# Patient Record
Sex: MALE | Race: WHITE | NOT HISPANIC OR LATINO | Employment: OTHER | URBAN - METROPOLITAN AREA
[De-identification: names, ages, dates, MRNs, and addresses within clinical notes are randomized per-mention and may not be internally consistent; named-entity substitution may affect disease eponyms.]

---

## 2023-03-08 ENCOUNTER — TELEPHONE (OUTPATIENT)
Dept: PRIMARY CARE | Facility: CLINIC | Age: 68
End: 2023-03-08
Payer: MEDICARE

## 2023-03-08 NOTE — TELEPHONE ENCOUNTER
Pt's wife, Alison, left a msg asking for a new order for a CT Cardiac Calcium Score be entered for Miguel.    Cyclosporine Counseling:  I discussed with the patient the risks of cyclosporine including but not limited to hypertension, gingival hyperplasia,myelosuppression, immunosuppression, liver damage, kidney damage, neurotoxicity, lymphoma, and serious infections. The patient understands that monitoring is required including baseline blood pressure, CBC, CMP, lipid panel and uric acid, and then 1-2 times monthly CMP and blood pressure.

## 2023-03-09 DIAGNOSIS — Z13.6 SCREENING FOR HEART DISEASE: Primary | ICD-10-CM

## 2023-03-10 ENCOUNTER — TELEPHONE (OUTPATIENT)
Dept: PRIMARY CARE | Facility: CLINIC | Age: 68
End: 2023-03-10

## 2023-03-10 NOTE — TELEPHONE ENCOUNTER
Patients wife came in asking about calcium screenings for her and Miguel. She said that you ordered this for them years ago and they never went through with it. She was wondering if they both can come in and get that ordered finally. She was also wondering if Miguel had to come in for an appointment in order to get the calcium.

## 2023-05-21 DIAGNOSIS — I10 PRIMARY HYPERTENSION: Primary | ICD-10-CM

## 2023-05-22 RX ORDER — LOSARTAN POTASSIUM 50 MG/1
TABLET ORAL
Qty: 90 TABLET | Refills: 3 | Status: SHIPPED | OUTPATIENT
Start: 2023-05-22 | End: 2024-04-01

## 2023-05-28 DIAGNOSIS — E03.9 HYPOTHYROIDISM, UNSPECIFIED TYPE: Primary | ICD-10-CM

## 2023-05-30 RX ORDER — LEVOTHYROXINE SODIUM 112 UG/1
TABLET ORAL
Qty: 100 TABLET | Refills: 2 | Status: SHIPPED | OUTPATIENT
Start: 2023-05-30 | End: 2024-04-01

## 2023-06-05 ENCOUNTER — TELEPHONE (OUTPATIENT)
Dept: PRIMARY CARE | Facility: CLINIC | Age: 68
End: 2023-06-05
Payer: MEDICARE

## 2023-06-05 NOTE — TELEPHONE ENCOUNTER
Pt's wife, Zuri, left a msg stating that Miguel injured his back last week and is doing exercises and it's not improving.  They are asking for a referral for PT, or an ortho dr.  He needs some kind of TX since this has been going on for over a week now.

## 2023-06-06 DIAGNOSIS — M54.9 ACUTE BACK PAIN, UNSPECIFIED BACK LOCATION, UNSPECIFIED BACK PAIN LATERALITY: ICD-10-CM

## 2023-06-08 NOTE — TELEPHONE ENCOUNTER
I spoke with the pt and he said that he went to the [facility] and they referred him to PT also.  He said that he is getting better and will continue with the PT.

## 2023-09-06 ENCOUNTER — APPOINTMENT (OUTPATIENT)
Dept: PRIMARY CARE | Facility: CLINIC | Age: 68
End: 2023-09-06
Payer: MEDICARE

## 2023-09-19 ENCOUNTER — APPOINTMENT (OUTPATIENT)
Dept: PRIMARY CARE | Facility: CLINIC | Age: 68
End: 2023-09-19
Payer: MEDICARE

## 2023-09-20 ENCOUNTER — OFFICE VISIT (OUTPATIENT)
Dept: PRIMARY CARE | Facility: CLINIC | Age: 68
End: 2023-09-20
Payer: MEDICARE

## 2023-09-20 VITALS
DIASTOLIC BLOOD PRESSURE: 70 MMHG | SYSTOLIC BLOOD PRESSURE: 112 MMHG | BODY MASS INDEX: 29.49 KG/M2 | WEIGHT: 187.87 LBS | HEIGHT: 67 IN

## 2023-09-20 DIAGNOSIS — I10 PRIMARY HYPERTENSION: ICD-10-CM

## 2023-09-20 DIAGNOSIS — Z00.00 ROUTINE CHECK-UP: Primary | ICD-10-CM

## 2023-09-20 DIAGNOSIS — Z23 ENCOUNTER FOR IMMUNIZATION: ICD-10-CM

## 2023-09-20 PROBLEM — J45.909 ASTHMA (HHS-HCC): Status: ACTIVE | Noted: 2023-09-20

## 2023-09-20 PROBLEM — E78.5 HYPERLIPIDEMIA: Status: ACTIVE | Noted: 2023-09-20

## 2023-09-20 PROBLEM — R35.0 URINARY FREQUENCY: Status: ACTIVE | Noted: 2023-09-20

## 2023-09-20 PROBLEM — R39.198 DIFFICULTY IN URINATION: Status: ACTIVE | Noted: 2023-09-20

## 2023-09-20 PROBLEM — E03.9 ADULT HYPOTHYROIDISM: Status: ACTIVE | Noted: 2023-09-20

## 2023-09-20 PROCEDURE — 3078F DIAST BP <80 MM HG: CPT | Performed by: INTERNAL MEDICINE

## 2023-09-20 PROCEDURE — 3074F SYST BP LT 130 MM HG: CPT | Performed by: INTERNAL MEDICINE

## 2023-09-20 PROCEDURE — G0439 PPPS, SUBSEQ VISIT: HCPCS | Performed by: INTERNAL MEDICINE

## 2023-09-20 PROCEDURE — 1036F TOBACCO NON-USER: CPT | Performed by: INTERNAL MEDICINE

## 2023-09-20 PROCEDURE — 1170F FXNL STATUS ASSESSED: CPT | Performed by: INTERNAL MEDICINE

## 2023-09-20 PROCEDURE — G0009 ADMIN PNEUMOCOCCAL VACCINE: HCPCS | Performed by: INTERNAL MEDICINE

## 2023-09-20 PROCEDURE — 1159F MED LIST DOCD IN RCRD: CPT | Performed by: INTERNAL MEDICINE

## 2023-09-20 PROCEDURE — 90662 IIV NO PRSV INCREASED AG IM: CPT | Performed by: INTERNAL MEDICINE

## 2023-09-20 PROCEDURE — 99397 PER PM REEVAL EST PAT 65+ YR: CPT | Performed by: INTERNAL MEDICINE

## 2023-09-20 PROCEDURE — 90677 PCV20 VACCINE IM: CPT | Performed by: INTERNAL MEDICINE

## 2023-09-20 PROCEDURE — G0008 ADMIN INFLUENZA VIRUS VAC: HCPCS | Performed by: INTERNAL MEDICINE

## 2023-09-20 PROCEDURE — 1160F RVW MEDS BY RX/DR IN RCRD: CPT | Performed by: INTERNAL MEDICINE

## 2023-09-20 RX ORDER — ASCORBIC ACID 300 MG
TABLET,CHEWABLE ORAL
COMMUNITY

## 2023-09-20 RX ORDER — BUDESONIDE AND FORMOTEROL FUMARATE DIHYDRATE 80; 4.5 UG/1; UG/1
2 AEROSOL RESPIRATORY (INHALATION) DAILY
COMMUNITY
Start: 2019-02-05 | End: 2024-02-01

## 2023-09-20 RX ORDER — MULTIVITAMIN
TABLET ORAL
COMMUNITY

## 2023-09-20 RX ORDER — AMLODIPINE BESYLATE 5 MG/1
1 TABLET ORAL DAILY
COMMUNITY
Start: 2019-02-05 | End: 2024-01-16 | Stop reason: SDUPTHER

## 2023-09-20 RX ORDER — MONTELUKAST SODIUM 10 MG/1
TABLET ORAL
COMMUNITY
Start: 2019-02-05 | End: 2024-04-04 | Stop reason: WASHOUT

## 2023-09-20 RX ORDER — SIMVASTATIN 5 MG/1
TABLET, FILM COATED ORAL
COMMUNITY
Start: 2019-02-05 | End: 2023-10-03 | Stop reason: ALTCHOICE

## 2023-09-20 RX ORDER — FAMOTIDINE 20 MG/1
20 TABLET, FILM COATED ORAL DAILY
COMMUNITY
End: 2024-04-04 | Stop reason: WASHOUT

## 2023-09-20 ASSESSMENT — PATIENT HEALTH QUESTIONNAIRE - PHQ9
2. FEELING DOWN, DEPRESSED OR HOPELESS: NOT AT ALL
SUM OF ALL RESPONSES TO PHQ9 QUESTIONS 1 AND 2: 0
1. LITTLE INTEREST OR PLEASURE IN DOING THINGS: NOT AT ALL

## 2023-09-20 NOTE — PROGRESS NOTES
"Subjective   Reason for Visit: Miguel May is an 67 y.o. male here for a Medicare Wellness visit.     Past Medical, Surgical, and Family History reviewed and updated in chart.    Reviewed all medications by prescribing practitioner or clinical pharmacist (such as prescriptions, OTCs, herbal therapies and supplements) and documented in the medical record.    HPI    Patient Care Team:  Mary Ellen Arroyo MD as PCP - General  Mary Ellen Arroyo MD as PCP - [employer] PCP     Review of Systems   All other systems reviewed and are negative.      Objective   Vitals:  /70   Ht 1.708 m (5' 7.25\")   Wt 85.2 kg (187 lb 13.9 oz)   BMI 29.21 kg/m²       Physical Exam  Constitutional:       General: He is not in acute distress.     Appearance: Normal appearance. He is not ill-appearing.   HENT:      Head: Normocephalic and atraumatic.      Right Ear: Tympanic membrane and ear canal normal.      Left Ear: Tympanic membrane and ear canal normal.      Nose: Nose normal.      Mouth/Throat:      Mouth: Mucous membranes are moist.      Pharynx: Oropharynx is clear.   Eyes:      General: No scleral icterus.     Extraocular Movements: Extraocular movements intact.      Conjunctiva/sclera: Conjunctivae normal.      Pupils: Pupils are equal, round, and reactive to light.   Cardiovascular:      Rate and Rhythm: Normal rate and regular rhythm.      Pulses: Normal pulses.      Heart sounds: No murmur heard.     No friction rub.   Pulmonary:      Effort: Pulmonary effort is normal.      Breath sounds: Normal breath sounds. No rhonchi or rales.   Abdominal:      General: Abdomen is flat. Bowel sounds are normal. There is no distension.      Palpations: Abdomen is soft. There is no mass.      Tenderness: There is no abdominal tenderness.   Musculoskeletal:      Cervical back: Normal range of motion and neck supple.      Right lower leg: No edema.      Left lower leg: No edema.   Skin:     General: Skin is warm. "   Neurological:      General: No focal deficit present.      Mental Status: He is alert and oriented to person, place, and time.      Cranial Nerves: No cranial nerve deficit.   Psychiatric:         Mood and Affect: Mood normal.         Behavior: Behavior normal.         Judgment: Judgment normal.       Assessment/Plan   Problem List Items Addressed This Visit    None  Visit Diagnoses       Routine check-up    -  Primary    Relevant Orders    CBC    Basic Metabolic Panel    Alanine Aminotransferase    Lipid Panel    Prostate Specific Antigen    Hemoglobin A1C    Encounter for immunization        Relevant Orders    Flu vaccine, quadrivalent, high-dose, preservative free, age 65y+ (FLUZONE) (Completed)    Primary hypertension        Relevant Orders    CBC          #1 hypertension- stable  #2 history of SVT-status post ablation 2013. No issue since. No recent palpitations. f/u cards  #3 hypothyroidism-on treatment for several years. Quite stable. Energy good. No heat/cold intolerance. No skin changes. Bowel stable. labs  #4 hyperlipidemia-on treatment , remains on a reduced sugar/carbohydrate/fat diet. Labs.  Consider intensifying pending results given strong family history.  Reviewed with patient  #5 asthma-using Symbicort once a day only with good control. Primarily exercise induced only. Very rare Ventolin use otherwise. No nighttime awakenings or shortness of breath wanted. f/u pulm  #6 AM urinary freq- stable  #7 increased PSA- normalized. Follow-up with urology PRN       +fhx CAD (father/brother)  Reviewed Shingrix   [id number] 9/18.   Pneumovax 9/19  colonoscopy 2016--> due 2026  reviewed risk of NSAIDS  COVID booster (BiValent) ASAP

## 2023-09-21 ASSESSMENT — ACTIVITIES OF DAILY LIVING (ADL)
GROCERY_SHOPPING: INDEPENDENT
TAKING_MEDICATION: INDEPENDENT
DOING_HOUSEWORK: INDEPENDENT
DRESSING: INDEPENDENT
BATHING: INDEPENDENT
MANAGING_FINANCES: INDEPENDENT

## 2023-09-21 ASSESSMENT — PATIENT HEALTH QUESTIONNAIRE - PHQ9
SUM OF ALL RESPONSES TO PHQ9 QUESTIONS 1 AND 2: 0
1. LITTLE INTEREST OR PLEASURE IN DOING THINGS: NOT AT ALL
2. FEELING DOWN, DEPRESSED OR HOPELESS: NOT AT ALL

## 2023-09-22 ENCOUNTER — LAB (OUTPATIENT)
Dept: LAB | Facility: LAB | Age: 68
End: 2023-09-22
Payer: MEDICARE

## 2023-09-22 DIAGNOSIS — Z00.00 ROUTINE CHECK-UP: ICD-10-CM

## 2023-09-22 DIAGNOSIS — I10 PRIMARY HYPERTENSION: ICD-10-CM

## 2023-09-22 LAB
ALANINE AMINOTRANSFERASE (SGPT) (U/L) IN SER/PLAS: 13 U/L (ref 10–52)
ANION GAP IN SER/PLAS: 11 MMOL/L (ref 10–20)
CALCIUM (MG/DL) IN SER/PLAS: 8.7 MG/DL (ref 8.6–10.3)
CARBON DIOXIDE, TOTAL (MMOL/L) IN SER/PLAS: 27 MMOL/L (ref 21–32)
CHLORIDE (MMOL/L) IN SER/PLAS: 108 MMOL/L (ref 98–107)
CHOLESTEROL (MG/DL) IN SER/PLAS: 167 MG/DL (ref 0–199)
CHOLESTEROL IN HDL (MG/DL) IN SER/PLAS: 62.2 MG/DL
CHOLESTEROL/HDL RATIO: 2.7
CREATININE (MG/DL) IN SER/PLAS: 1.14 MG/DL (ref 0.5–1.3)
ERYTHROCYTE DISTRIBUTION WIDTH (RATIO) BY AUTOMATED COUNT: 14.3 % (ref 11.5–14.5)
ERYTHROCYTE MEAN CORPUSCULAR HEMOGLOBIN CONCENTRATION (G/DL) BY AUTOMATED: 30.4 G/DL (ref 32–36)
ERYTHROCYTE MEAN CORPUSCULAR VOLUME (FL) BY AUTOMATED COUNT: 85 FL (ref 80–100)
ERYTHROCYTES (10*6/UL) IN BLOOD BY AUTOMATED COUNT: 4.86 X10E12/L (ref 4.5–5.9)
ESTIMATED AVERAGE GLUCOSE FOR HBA1C: 120 MG/DL
GFR MALE: 69 ML/MIN/1.73M2
GLUCOSE (MG/DL) IN SER/PLAS: 99 MG/DL (ref 74–99)
HEMATOCRIT (%) IN BLOOD BY AUTOMATED COUNT: 41.1 % (ref 41–52)
HEMOGLOBIN (G/DL) IN BLOOD: 12.5 G/DL (ref 13.5–17.5)
HEMOGLOBIN A1C/HEMOGLOBIN TOTAL IN BLOOD: 5.8 %
LDL: 93 MG/DL (ref 0–99)
LEUKOCYTES (10*3/UL) IN BLOOD BY AUTOMATED COUNT: 4.5 X10E9/L (ref 4.4–11.3)
PLATELETS (10*3/UL) IN BLOOD AUTOMATED COUNT: 172 X10E9/L (ref 150–450)
POTASSIUM (MMOL/L) IN SER/PLAS: 4.5 MMOL/L (ref 3.5–5.3)
PROSTATE SPECIFIC AG (NG/ML) IN SER/PLAS: 1.35 NG/ML (ref 0–4)
SODIUM (MMOL/L) IN SER/PLAS: 141 MMOL/L (ref 136–145)
TRIGLYCERIDE (MG/DL) IN SER/PLAS: 58 MG/DL (ref 0–149)
UREA NITROGEN (MG/DL) IN SER/PLAS: 14 MG/DL (ref 6–23)
VLDL: 12 MG/DL (ref 0–40)

## 2023-09-22 PROCEDURE — 36415 COLL VENOUS BLD VENIPUNCTURE: CPT

## 2023-09-22 PROCEDURE — 80061 LIPID PANEL: CPT

## 2023-09-22 PROCEDURE — 85027 COMPLETE CBC AUTOMATED: CPT

## 2023-09-22 PROCEDURE — 84460 ALANINE AMINO (ALT) (SGPT): CPT

## 2023-09-22 PROCEDURE — 83036 HEMOGLOBIN GLYCOSYLATED A1C: CPT

## 2023-09-22 PROCEDURE — 84153 ASSAY OF PSA TOTAL: CPT

## 2023-09-22 PROCEDURE — 80048 BASIC METABOLIC PNL TOTAL CA: CPT

## 2023-10-03 ENCOUNTER — OFFICE VISIT (OUTPATIENT)
Dept: PRIMARY CARE | Facility: CLINIC | Age: 68
End: 2023-10-03
Payer: MEDICARE

## 2023-10-03 VITALS — SYSTOLIC BLOOD PRESSURE: 114 MMHG | DIASTOLIC BLOOD PRESSURE: 72 MMHG

## 2023-10-03 DIAGNOSIS — R73.01 IMPAIRED FASTING BLOOD SUGAR: ICD-10-CM

## 2023-10-03 DIAGNOSIS — D64.9 ANEMIA, UNSPECIFIED TYPE: Primary | ICD-10-CM

## 2023-10-03 DIAGNOSIS — E03.9 HYPOTHYROIDISM, UNSPECIFIED TYPE: ICD-10-CM

## 2023-10-03 DIAGNOSIS — E78.5 HYPERLIPIDEMIA, UNSPECIFIED HYPERLIPIDEMIA TYPE: ICD-10-CM

## 2023-10-03 PROCEDURE — 99213 OFFICE O/P EST LOW 20 MIN: CPT | Performed by: INTERNAL MEDICINE

## 2023-10-03 PROCEDURE — 3074F SYST BP LT 130 MM HG: CPT | Performed by: INTERNAL MEDICINE

## 2023-10-03 PROCEDURE — 1159F MED LIST DOCD IN RCRD: CPT | Performed by: INTERNAL MEDICINE

## 2023-10-03 PROCEDURE — 1160F RVW MEDS BY RX/DR IN RCRD: CPT | Performed by: INTERNAL MEDICINE

## 2023-10-03 PROCEDURE — 1036F TOBACCO NON-USER: CPT | Performed by: INTERNAL MEDICINE

## 2023-10-03 PROCEDURE — 3078F DIAST BP <80 MM HG: CPT | Performed by: INTERNAL MEDICINE

## 2023-10-03 RX ORDER — ATORVASTATIN CALCIUM 10 MG/1
10 TABLET, FILM COATED ORAL DAILY
Qty: 30 TABLET | Refills: 5 | Status: SHIPPED | OUTPATIENT
Start: 2023-10-03 | End: 2024-01-16 | Stop reason: SDUPTHER

## 2023-10-03 NOTE — PROGRESS NOTES
Subjective   Patient ID: Miguel May is a 67 y.o. male who presents for follow up blood work.    HPI   Follow-up lab work.  Overall feels well.  Review of Systems    Objective   /72     Physical Exam    Lab Results   Component Value Date    WBC 4.5 09/22/2023    HGB 12.5 (L) 09/22/2023    HCT 41.1 09/22/2023     09/22/2023    CHOL 167 09/22/2023    TRIG 58 09/22/2023    HDL 62.2 09/22/2023    ALT 13 09/22/2023    AST 18 08/05/2019     09/22/2023    K 4.5 09/22/2023     (H) 09/22/2023    CREATININE 1.14 09/22/2023    BUN 14 09/22/2023    CO2 27 09/22/2023    TSH 2.43 09/21/2021    PSA 1.35 09/22/2023    HGBA1C 5.8 (A) 09/22/2023      Assessment/Plan       #1 hypertension- stable  #2 history of SVT-status post ablation 2013. No issue since. No recent palpitations. f/u cards  #3 hypothyroidism-on treatment for several years. Quite stable. Energy good. No heat/cold intolerance. No skin changes. Bowel stable. labs  #4 hyperlipidemia- will change to lipitor 10 given strong fhx  #5 asthma-using Symbicort once a day only with good control. Primarily exercise induced only. Very rare Ventolin use otherwise. No nighttime awakenings or shortness of breath wanted. f/u pulm  #6 AM urinary freq- stable  #7 increased PSA- normalized. Follow-up with urology PRN  #8 ifbs-spent significant time reviewing.  Reviewed low sugar/carbohydrate diet daily exercise weight loss.  Consideration of metformin, declines now.  Recheck 4 months.  #9 mild anemia-likely secondary to blood donations.  Repeat 4 weeks with no blood draws prior     +fhx CAD (father/brother)  Reviewed Shingrix   colonoscopy 2016--> due 2026  reviewed risk of NSAIDS  COVID booster (BiValent) ASAP

## 2023-10-27 ENCOUNTER — LAB (OUTPATIENT)
Dept: LAB | Facility: LAB | Age: 68
End: 2023-10-27
Payer: MEDICARE

## 2023-10-27 DIAGNOSIS — D64.9 ANEMIA, UNSPECIFIED TYPE: ICD-10-CM

## 2023-10-27 DIAGNOSIS — E03.9 HYPOTHYROIDISM, UNSPECIFIED TYPE: ICD-10-CM

## 2023-10-27 LAB
ERYTHROCYTE [DISTWIDTH] IN BLOOD BY AUTOMATED COUNT: 14.3 % (ref 11.5–14.5)
FERRITIN SERPL-MCNC: 21 NG/ML (ref 20–300)
FOLATE SERPL-MCNC: >22.3 NG/ML
HCT VFR BLD AUTO: 40.7 % (ref 41–52)
HGB BLD-MCNC: 12.7 G/DL (ref 13.5–17.5)
IRON SATN MFR SERPL: 13 % (ref 25–45)
IRON SERPL-MCNC: 52 UG/DL (ref 35–150)
MCH RBC QN AUTO: 25.7 PG (ref 26–34)
MCHC RBC AUTO-ENTMCNC: 31.2 G/DL (ref 32–36)
MCV RBC AUTO: 82 FL (ref 80–100)
NRBC BLD-RTO: 0 /100 WBCS (ref 0–0)
PLATELET # BLD AUTO: 237 X10*3/UL (ref 150–450)
PMV BLD AUTO: 11 FL (ref 7.5–11.5)
RBC # BLD AUTO: 4.94 X10*6/UL (ref 4.5–5.9)
TIBC SERPL-MCNC: 403 UG/DL (ref 240–445)
TSH SERPL-ACNC: 1.31 MIU/L (ref 0.44–3.98)
UIBC SERPL-MCNC: 351 UG/DL (ref 110–370)
VIT B12 SERPL-MCNC: 342 PG/ML (ref 211–911)
WBC # BLD AUTO: 7.2 X10*3/UL (ref 4.4–11.3)

## 2023-10-27 PROCEDURE — 84443 ASSAY THYROID STIM HORMONE: CPT

## 2023-10-27 PROCEDURE — 36415 COLL VENOUS BLD VENIPUNCTURE: CPT

## 2023-10-27 PROCEDURE — 83540 ASSAY OF IRON: CPT

## 2023-10-27 PROCEDURE — 82746 ASSAY OF FOLIC ACID SERUM: CPT

## 2023-10-27 PROCEDURE — 82607 VITAMIN B-12: CPT

## 2023-10-27 PROCEDURE — 82728 ASSAY OF FERRITIN: CPT

## 2023-10-27 PROCEDURE — 85027 COMPLETE CBC AUTOMATED: CPT

## 2023-10-27 PROCEDURE — 83550 IRON BINDING TEST: CPT

## 2023-10-28 DIAGNOSIS — E78.5 HYPERLIPIDEMIA, UNSPECIFIED HYPERLIPIDEMIA TYPE: Primary | ICD-10-CM

## 2023-10-30 RX ORDER — SIMVASTATIN 5 MG/1
5 TABLET, FILM COATED ORAL NIGHTLY
Qty: 90 TABLET | Refills: 2 | Status: SHIPPED | OUTPATIENT
Start: 2023-10-30 | End: 2024-04-04 | Stop reason: WASHOUT

## 2023-11-01 ENCOUNTER — EVALUATION (OUTPATIENT)
Dept: PHYSICAL THERAPY | Facility: CLINIC | Age: 68
End: 2023-11-01
Payer: MEDICARE

## 2023-11-01 DIAGNOSIS — M54.50 ACUTE BILATERAL LOW BACK PAIN: Primary | ICD-10-CM

## 2023-11-01 PROBLEM — M54.40 ACUTE BILATERAL LOW BACK PAIN WITH SCIATICA: Status: ACTIVE | Noted: 2023-11-01

## 2023-11-01 PROCEDURE — 97161 PT EVAL LOW COMPLEX 20 MIN: CPT | Mod: GP | Performed by: PHYSICAL THERAPIST

## 2023-11-01 PROCEDURE — 97110 THERAPEUTIC EXERCISES: CPT | Mod: GP | Performed by: PHYSICAL THERAPIST

## 2023-11-01 ASSESSMENT — ENCOUNTER SYMPTOMS
DEPRESSION: 0
OCCASIONAL FEELINGS OF UNSTEADINESS: 0
LOSS OF SENSATION IN FEET: 0

## 2023-11-01 NOTE — LETTER
November 1, 2023     Patient: Miguel May   YOB: 1955   Date of Visit: 11/1/2023       To Whom It May Concern:    It is my medical opinion that Miguel May {Work release (duty restriction):91059}.    If you have any questions or concerns, please don't hesitate to call.         Sincerely,        Dl Ward, PT    CC: No Recipients

## 2023-11-01 NOTE — LETTER
November 1, 2023     Patient: Miguel May   YOB: 1955   Date of Visit: 11/1/2023       To Whom it May Concern:    Miguel May was seen in my clinic on 11/1/2023. He {Return to school/sport:36349}.    If you have any questions or concerns, please don't hesitate to call.         Sincerely,          Dl Ward, PT        CC: No Recipients

## 2023-11-01 NOTE — PROGRESS NOTES
"Physical Therapy Evaluation    Patient Name: Miguel May  MRN: [id number]  Today's Date: 11/1/2023  Visit:  1  Referred by: Dr. Arroyo  Diagnosis: acute LBP    SUBJECTIVE:  Pt. Is a 68 y.o. english speaking male with 2-3 month of insidious onset of LBP.  Was golfing ~3 weeks ago and while using his  he felt a pain in the R) posterior thigh immediately after striking the ball.  Shortly after that he started feeling LBP also.  Pain: across LB 0-5/10, post. Thigh pain 0-5/10  Worse: end range flexion movements and flexed postures, prolonged sitting, running, walking briskly, incline walking.  Better: on the move.  Pertinent negatives: (-) cs,nt.bb,weakness, np  Has Hx of recurrent LBP.  XR- 2019- moderate disc space narrowing L5-S1 and mild L4-5  Facet joint hypertrophy at same levels.    Home Living: no issues    Prior level of function:  No limitations    OBJECTIVE:  Kyphotic sitting postural with loss of lordosis.  Tight HS, and hip flexors bilaterally  LE lumbar myotome strength 5/5 bilaterally  SLR- 65* R) with ERP post. Thigh,  L) 75* (-) pain  ROM LB-  F- full, post. Thigh pain at end range, same with reps, but LB after reps, stopped after ~30 sec.  E- mod limit, end range strain, same with reps  SG/SB- Bilaterally with min. Limitation, no pain, same with reps  Lower abdominals 4/5  Hip ABD 4+/5  Outcome Measure:  Oswestry 22%    ASSESSMENT:  Pt. With degenerative lumbar spine worse at lowest 2 levels.  He has symptoms most consistent with disc bulging and stenosis that requires PT to address to allow improved symptoms and functional tolerances.    TREATMENT:  - Therex:  Child's pose 30 sec. X 5  Supine active HS S 10 sec. X 5 ea.  Press-up x 10  Walker Hip Flexor S 45\" ea.  Sitting 1/2 long sit HS S 15\" x 5 ea.  Pt. Instructed on and practiced neutral spine sitting  - Manual Therapy:    - Modalities:      PATIENT EDUCATION: HEP    PLAN:   Daily HEP with extension routine of KELECHI x 3 min. And Press-ups x " 10 prior to rising.  EIS throughout day.  Maintenance of lordosis with sitting.  Other ex's 1x/day.  Rehab potential: fair to good  Plan of care agreement:Y    GOALS:  Active       PT Problem       Pt. will have decreased reports of pain by at least 2 levels using a VAS       Start:  11/01/23    Expected End:  12/29/23            Pt. will have improved score on Oswestry by at least 15%        Start:  11/01/23    Expected End:  12/29/23            Pt. will have improved pain free AROM to assist with improving functional tolerances       Start:  11/01/23    Expected End:  12/29/23            Pt. will have improved LE and core strength to assist with improving functional tolerances       Start:  11/01/23    Expected End:  12/29/23            Pt. will be able to stand and walk for longer periods        Start:  11/01/23    Expected End:  12/29/23

## 2023-11-07 ENCOUNTER — OFFICE VISIT (OUTPATIENT)
Dept: DERMATOLOGY | Facility: CLINIC | Age: 68
End: 2023-11-07
Payer: MEDICARE

## 2023-11-07 DIAGNOSIS — L82.0 INFLAMED SEBORRHEIC KERATOSIS: ICD-10-CM

## 2023-11-07 DIAGNOSIS — Z86.018 HISTORY OF DYSPLASTIC NEVUS: ICD-10-CM

## 2023-11-07 DIAGNOSIS — L21.9 SEBORRHEIC DERMATITIS: ICD-10-CM

## 2023-11-07 DIAGNOSIS — L57.0 ACTINIC KERATOSIS: ICD-10-CM

## 2023-11-07 DIAGNOSIS — L57.8 DIFFUSE PHOTODAMAGE OF SKIN: ICD-10-CM

## 2023-11-07 DIAGNOSIS — D48.5 NEOPLASM OF UNCERTAIN BEHAVIOR OF SKIN: Primary | ICD-10-CM

## 2023-11-07 PROCEDURE — 17004 DESTROY PREMAL LESIONS 15/>: CPT | Performed by: DERMATOLOGY

## 2023-11-07 PROCEDURE — 1159F MED LIST DOCD IN RCRD: CPT | Performed by: DERMATOLOGY

## 2023-11-07 PROCEDURE — 88305 TISSUE EXAM BY PATHOLOGIST: CPT | Performed by: DERMATOLOGY

## 2023-11-07 PROCEDURE — 17110 DESTRUCTION B9 LES UP TO 14: CPT | Performed by: DERMATOLOGY

## 2023-11-07 PROCEDURE — 11102 TANGNTL BX SKIN SINGLE LES: CPT | Performed by: DERMATOLOGY

## 2023-11-07 PROCEDURE — 11301 SHAVE SKIN LESION 0.6-1.0 CM: CPT | Performed by: DERMATOLOGY

## 2023-11-07 PROCEDURE — 3074F SYST BP LT 130 MM HG: CPT | Performed by: DERMATOLOGY

## 2023-11-07 PROCEDURE — 3078F DIAST BP <80 MM HG: CPT | Performed by: DERMATOLOGY

## 2023-11-07 PROCEDURE — 1160F RVW MEDS BY RX/DR IN RCRD: CPT | Performed by: DERMATOLOGY

## 2023-11-07 PROCEDURE — 1036F TOBACCO NON-USER: CPT | Performed by: DERMATOLOGY

## 2023-11-07 PROCEDURE — 88305 TISSUE EXAM BY PATHOLOGIST: CPT | Mod: TC,DER | Performed by: DERMATOLOGY

## 2023-11-07 PROCEDURE — 99214 OFFICE O/P EST MOD 30 MIN: CPT | Performed by: DERMATOLOGY

## 2023-11-07 RX ORDER — KETOCONAZOLE 20 MG/ML
SHAMPOO, SUSPENSION TOPICAL 3 TIMES WEEKLY
Qty: 120 ML | Refills: 11 | Status: SHIPPED | OUTPATIENT
Start: 2023-11-08 | End: 2024-01-17 | Stop reason: WASHOUT

## 2023-11-07 ASSESSMENT — DERMATOLOGY QUALITY OF LIFE (QOL) ASSESSMENT
ARE THERE EXCLUSIONS OR EXCEPTIONS FOR THE QUALITY OF LIFE ASSESSMENT: NO
RATE HOW EMOTIONALLY BOTHERED YOU ARE BY YOUR SKIN PROBLEM (FOR EXAMPLE, WORRY, EMBARRASSMENT, FRUSTRATION): 0 - NEVER BOTHERED
WHAT SINGLE SKIN CONDITION LISTED BELOW IS THE PATIENT ANSWERING THE QUALITY-OF-LIFE ASSESSMENT QUESTIONS ABOUT: NONE OF THE ABOVE
RATE HOW BOTHERED YOU ARE BY EFFECTS OF YOUR SKIN PROBLEMS ON YOUR ACTIVITIES (EG, GOING OUT, ACCOMPLISHING WHAT YOU WANT, WORK ACTIVITIES OR YOUR RELATIONSHIPS WITH OTHERS): 0 - NEVER BOTHERED
RATE HOW BOTHERED YOU ARE BY SYMPTOMS OF YOUR SKIN PROBLEM (EG, ITCHING, STINGING BURNING, HURTING OR SKIN IRRITATION): 0 - NEVER BOTHERED
DATE THE QUALITY-OF-LIFE ASSESSMENT WAS COMPLETED: 66785

## 2023-11-07 ASSESSMENT — DERMATOLOGY PATIENT ASSESSMENT
DO YOU HAVE ANY NEW OR CHANGING LESIONS: NO
HAVE YOU HAD OR DO YOU HAVE VASCULAR DISEASE: NO
DO YOU USE SUNSCREEN: OCCASIONALLY
HAVE YOU HAD OR DO YOU HAVE A STAPH INFECTION: NO
DO YOU USE A TANNING BED: NO

## 2023-11-07 ASSESSMENT — PATIENT GLOBAL ASSESSMENT (PGA): PATIENT GLOBAL ASSESSMENT: PATIENT GLOBAL ASSESSMENT:  1 - CLEAR

## 2023-11-07 NOTE — PROGRESS NOTES
Subjective     Miguel May is a 68 y.o. male who presents for the following: Skin Exam.  He notes a raised, scaly, itchy bump on the back of his right leg, which has been present for several months and has not changed in any other way, including in color or size, and does not hurt or bleed.    Review of Systems:  No other skin or systemic complaints other than what is documented elsewhere in the note.    The following portions of the chart were reviewed this encounter and updated as appropriate:       Skin Cancer History  No skin cancer on file.    Specialty Problems    None      Past Dermatologic / Past Relevant Medical History:    - compound dysplastic nevus with mild atypia on right medial upper back inferior diagnosed at initial visit on 12/19/19   - AKs   - no history of skin cancer    Family History:    Father - nonmelanoma skin cancers  No family history of melanoma    Social History:    The patient is retired from working in tax management and recently moved from [place] to [place] with his wife after they retired; his wife is a patient in our office as well    Allergies:  Patient has no known allergies.    Current Medications / CAM's:    Current Outpatient Medications:     amLODIPine (Norvasc) 5 mg tablet, Take 1 tablet (5 mg) by mouth once daily., Disp: , Rfl:     atorvastatin (Lipitor) 10 mg tablet, Take 1 tablet (10 mg) by mouth once daily., Disp: 30 tablet, Rfl: 5    famotidine (Pepcid) 20 mg tablet, Take 1 tablet (20 mg) by mouth once daily., Disp: , Rfl:     glucos sul 2KCl-msm-chond-C-Mn (Glucosamine Chondroitin) 550-30-1 mg capsule, Take by mouth., Disp: , Rfl:     [START ON 11/8/2023] ketoconazole (NIZOral) 2 % shampoo, Apply topically 3 times a week., Disp: 120 mL, Rfl: 11    levothyroxine (Synthroid, Levoxyl) 112 mcg tablet, TAKE 1 TABLET BY MOUTH DAILY, Disp: 100 tablet, Rfl: 2    losartan (Cozaar) 50 mg tablet, TAKE 1 TABLET BY MOUTH  DAILY AS DIRECTED, Disp: 90 tablet, Rfl: 3     montelukast (Singulair) 10 mg tablet, Take by mouth., Disp: , Rfl:     multivitamin tablet, Take by mouth., Disp: , Rfl:     simvastatin (Zocor) 5 mg tablet, TAKE 1 TABLET BY MOUTH EVERY DAY AT BEDTIME, Disp: 90 tablet, Rfl: 2    Symbicort 80-4.5 mcg/actuation inhaler, Inhale 2 puffs once daily., Disp: , Rfl:     tumeric-ging-olive-oreg-capryl 100 mg-150 mg- 50 mg-150 mg capsule, Take by mouth., Disp: , Rfl:      Objective   Well appearing patient in no apparent distress; mood and affect are within normal limits.    A full examination was performed including scalp, face, eyes, ears, nose, lips, neck, chest, axillae, abdomen, back, bilateral upper extremities, and bilateral lower extremities. All findings within normal limits unless otherwise noted below.    Assessment/Plan   1. Neoplasm of uncertain behavior of skin (2)  Left Posterior Shoulder  5 mm dark brown pigmented, asymmetric macule with an asymmetric pigment network and irregular borders           Shave removal    Lesion length (cm):  0.5  Margin per side (cm):  0.2  Lesion diameter (cm):  0.9  Informed consent: discussed and consent obtained    Timeout: patient name, date of birth, surgical site, and procedure verified    Procedure prep:  Patient was prepped and draped  Anesthesia: the lesion was anesthetized in a standard fashion    Anesthetic:  1% lidocaine w/ epinephrine 1-100,000 local infiltration  Instrument used: flexible razor blade    Hemostasis achieved with: aluminum chloride    Outcome: patient tolerated procedure well    Post-procedure details: sterile dressing applied and wound care instructions given    Dressing type: bandage and petrolatum      Staff Communication: Dermatology Local Anesthesia: 1 % Lidocaine / Epinephrine - Amount:0.5ml    Specimen 1 - Dermatopathology- DERM LAB  Differential Diagnosis: DN v SK  Check Margins Yes/No?:    Comments:    Dermpath Lab: Routine Histopathology (formalin-fixed tissue)    Right Upper Chest  8 mm  erythematous, scaly papule           Lesion biopsy  Type of biopsy: tangential    Informed consent: discussed and consent obtained    Timeout: patient name, date of birth, surgical site, and procedure verified    Procedure prep:  Patient was prepped and draped  Anesthesia: the lesion was anesthetized in a standard fashion    Anesthetic:  1% lidocaine w/ epinephrine 1-100,000 local infiltration  Instrument used: DermaBlade    Hemostasis achieved with: aluminum chloride    Outcome: patient tolerated procedure well    Post-procedure details: sterile dressing applied and wound care instructions given    Dressing type: petrolatum and bandage      Staff Communication: Dermatology Local Anesthesia: 1 % Lidocaine / Epinephrine - Amount:0.5ml    Specimen 2 - Dermatopathology- DERM LAB  Differential Diagnosis: AK v SCCIS v SK v BCC  Check Margins Yes/No?:    Comments:    Dermpath Lab: Routine Histopathology (formalin-fixed tissue)    2. Actinic keratosis (17)  Head - Anterior (Face) (17)  Scattered on the patient's face, there are multiple erythematous, gritty, scaly macules     Actinic Keratoses -scattered on face.  The pre-cancerous nature of these lesions and treatment options were discussed with the patient today.  At this time, I recommend treatment with liquid nitrogen cryotherapy.  The patient expressed understanding, is in agreement with this plan, and wishes to proceed with cryotherapy today.    Destr of lesion - Head - Anterior (Face)  Complexity: simple    Destruction method: cryotherapy    Informed consent: discussed and consent obtained    Lesion destroyed using liquid nitrogen: Yes    Cryotherapy cycles:  1  Outcome: patient tolerated procedure well with no complications    Post-procedure details: wound care instructions given      3. Inflamed seborrheic keratosis  Right Lower Leg - Posterior  On the patient's right posterior mid leg, there is a 1 cm erythematous and light brown-colored, hyperkeratotic, stuck-on  appearing papule with a surrounding rim of erythema    Inflamed Seborrheic Keratosis -right posterior mid leg.  The benign nature of this lesion was discussed with the patient today and reassurance provided.  Given the history the patient provides of frequent irritation and associated symptoms as well as its inflamed appearance on exam today, I offered to treat this lesion with liquid nitrogen cryotherapy.  The patient expressed understanding, is in agreement with this plan, and wishes to proceed with cryotherapy today.    Destr of lesion - Right Lower Leg - Posterior  Complexity: simple    Destruction method: cryotherapy    Informed consent: discussed and consent obtained    Lesion destroyed using liquid nitrogen: Yes    Cryotherapy cycles:  2  Outcome: patient tolerated procedure well with no complications    Post-procedure details: wound care instructions given      4. Seborrheic dermatitis  Scalp  On the patient's scalp, there are pink, scaly patches with whitish-yellowish, greasy scale    Seborrheic Dermatitis - scalp.  The potentially chronic and intermittently flaring nature of this condition and treatment options were discussed extensively with the patient today.  At this time, I recommend topical anti-fungal therapy with Ketoconazole 2% shampoo, which the patient was instructed to use 2-3 days per week, alternating with over-the-counter anti-dandruff shampoos, such as Head & Shoulders, Selsun Blue, and Neutrogena T-gel, every month.  The risks, benefits, and side effects of this medication were discussed.  The patient expressed understanding and is in agreement with this plan.    ketoconazole (NIZOral) 2 % shampoo - Scalp  Apply topically 3 times a week.    5. History of dysplastic nevus  On the patient's right medial upper back inferior, there is a well-healed scar with no evidence of recurrent growth or pigmentation on exam today.    History of dysplastic nevi and photodamage.  There is no evidence of  recurrence on exam today.  I emphasized the importance of continuing to perform monthly self-skin exams using the ABCDs of monitoring moles, which were reviewed with the patient, as well as the importance of sun avoidance and sun protection with daily sunscreen use.  I will have the patient return to our office in 6 to 12 months, pending the above biopsy results, for routine follow-up and skin exam, and the patient was instructed to call our office should the patient notice any new, changing, symptomatic, or otherwise concerning skin lesions before then.  The patient expressed understanding and is in agreement with this plan.    6. Diffuse photodamage of skin  Photodistributed  Diffuse photodamage with actinic changes with telangiectasia and mottled pigmentation in sun-exposed areas.    Photodamage.  The signs and symptoms of skin cancer were reviewed and the patient was advised to practice sun protection and sun avoidance, use daily sunscreen, and perform regular self skin exams.  Sun protection was discussed, including avoiding the mid-day sun, wearing a sunscreen with SPF at least 50, and stressing the need for reapplication of sunscreen and applying more than they think they need.

## 2023-11-09 LAB
LABORATORY COMMENT REPORT: NORMAL
PATH REPORT.FINAL DX SPEC: NORMAL
PATH REPORT.GROSS SPEC: NORMAL
PATH REPORT.MICROSCOPIC SPEC OTHER STN: NORMAL
PATH REPORT.RELEVANT HX SPEC: NORMAL
PATH REPORT.TOTAL CANCER: NORMAL

## 2023-11-10 ENCOUNTER — TELEPHONE (OUTPATIENT)
Dept: PRIMARY CARE | Facility: CLINIC | Age: 68
End: 2023-11-10
Payer: MEDICARE

## 2023-11-14 ENCOUNTER — APPOINTMENT (OUTPATIENT)
Dept: PHYSICAL THERAPY | Facility: CLINIC | Age: 68
End: 2023-11-14
Payer: MEDICARE

## 2023-11-14 DIAGNOSIS — E78.5 HYPERLIPIDEMIA, UNSPECIFIED HYPERLIPIDEMIA TYPE: ICD-10-CM

## 2023-11-15 ENCOUNTER — OFFICE VISIT (OUTPATIENT)
Dept: DERMATOLOGY | Facility: CLINIC | Age: 68
End: 2023-11-15
Payer: MEDICARE

## 2023-11-15 DIAGNOSIS — L57.0 ACTINIC KERATOSIS: Primary | ICD-10-CM

## 2023-11-15 DIAGNOSIS — L82.1 SEBORRHEIC KERATOSIS: ICD-10-CM

## 2023-11-15 DIAGNOSIS — L81.4 LENTIGO: ICD-10-CM

## 2023-11-15 DIAGNOSIS — D48.5 NEOPLASM OF UNCERTAIN BEHAVIOR OF SKIN: ICD-10-CM

## 2023-11-15 DIAGNOSIS — D22.5 MELANOCYTIC NEVUS OF TRUNK: ICD-10-CM

## 2023-11-15 DIAGNOSIS — L57.8 DIFFUSE PHOTODAMAGE OF SKIN: ICD-10-CM

## 2023-11-15 PROCEDURE — 99213 OFFICE O/P EST LOW 20 MIN: CPT | Performed by: DERMATOLOGY

## 2023-11-15 PROCEDURE — 1160F RVW MEDS BY RX/DR IN RCRD: CPT | Performed by: DERMATOLOGY

## 2023-11-15 PROCEDURE — 1036F TOBACCO NON-USER: CPT | Performed by: DERMATOLOGY

## 2023-11-15 PROCEDURE — 17004 DESTROY PREMAL LESIONS 15/>: CPT | Performed by: DERMATOLOGY

## 2023-11-15 PROCEDURE — 1159F MED LIST DOCD IN RCRD: CPT | Performed by: DERMATOLOGY

## 2023-11-15 ASSESSMENT — DERMATOLOGY PATIENT ASSESSMENT
DO YOU USE A TANNING BED: NO
DO YOU USE SUNSCREEN: OCCASIONALLY
DO YOU HAVE ANY NEW OR CHANGING LESIONS: NO

## 2023-11-15 ASSESSMENT — DERMATOLOGY QUALITY OF LIFE (QOL) ASSESSMENT: ARE THERE EXCLUSIONS OR EXCEPTIONS FOR THE QUALITY OF LIFE ASSESSMENT: NO

## 2023-11-15 NOTE — PROGRESS NOTES
Subjective     Miguel May is a 68 y.o. male who presents for the following: Discuss recent biopsy result and treatment options.  Biopsy of a suspicious lesion on his right upper chest performed at his last visit in our office on 11/7/23 revealed a hypertrophic actinic keratosis.  Of note, biopsy of an atypical appearing pigmented lesion on his left posterior shoulder also performed at that visit revealed a benign pigmented keratosis with mild melanocyte hyperplasia, for which the microscopic differential diagnosis included a solar lentigo and early/evolving junctional lentiginous nevus.    Today, the patient states the biopsy sites healed well.  He also reports he has noticed several other dry, scaly, rough areas on his chest and shoulders.  He states these lesions are asymptomatic with no associated bleeding, itching, pr burning, and they have not changed in any way, including in size, shape, or color, but they have not resolved.    Review of Systems:  No other skin or systemic complaints other than what is documented elsewhere in the note.    The following portions of the chart were reviewed this encounter and updated as appropriate:       Skin Cancer History  No skin cancer on file.    Specialty Problems    None      Past Dermatologic / Past Relevant Medical History:    - compound dysplastic nevus with mild atypia on right medial upper back inferior diagnosed at initial visit on 12/19/19   - AKs   - no history of skin cancer    Family History:    Father - nonmelanoma skin cancers  No family history of melanoma    Social History:    The patient is retired from working in [employer] and recently moved from [place] to [place] with his wife after they retired; his wife is a patient in our office as well    Allergies:  Patient has no known allergies.    Current Medications / CAM's:    Current Outpatient Medications:     amLODIPine (Norvasc) 5 mg tablet, Take 1 tablet (5 mg) by mouth once daily., Disp:  , Rfl:     atorvastatin (Lipitor) 10 mg tablet, Take 1 tablet (10 mg) by mouth once daily., Disp: 30 tablet, Rfl: 5    famotidine (Pepcid) 20 mg tablet, Take 1 tablet (20 mg) by mouth once daily., Disp: , Rfl:     glucos sul 2KCl-msm-chond-C-Mn (Glucosamine Chondroitin) 550-30-1 mg capsule, Take by mouth., Disp: , Rfl:     ketoconazole (NIZOral) 2 % shampoo, Apply topically 3 times a week., Disp: 120 mL, Rfl: 11    levothyroxine (Synthroid, Levoxyl) 112 mcg tablet, TAKE 1 TABLET BY MOUTH DAILY, Disp: 100 tablet, Rfl: 2    losartan (Cozaar) 50 mg tablet, TAKE 1 TABLET BY MOUTH  DAILY AS DIRECTED, Disp: 90 tablet, Rfl: 3    montelukast (Singulair) 10 mg tablet, Take by mouth., Disp: , Rfl:     multivitamin tablet, Take by mouth., Disp: , Rfl:     simvastatin (Zocor) 5 mg tablet, TAKE 1 TABLET BY MOUTH EVERY DAY AT BEDTIME, Disp: 90 tablet, Rfl: 2    Symbicort 80-4.5 mcg/actuation inhaler, Inhale 2 puffs once daily., Disp: , Rfl:     tumeric-ging-olive-oreg-capryl 100 mg-150 mg- 50 mg-150 mg capsule, Take by mouth., Disp: , Rfl:      Objective   Well appearing patient in no apparent distress; mood and affect are within normal limits.    A skin examination was performed including: Face, neck, and chest. All findings within normal limits unless otherwise noted below.    Assessment/Plan   1. Actinic keratosis (16)  Right Breast (16)  On the patient's right upper chest, there is a pink, well-healed scar at the recent biopsy site with a surrounding rim of erythema, grittiness, and scale; scattered on the patient's chest and bilateral shoulders, there are multiple erythematous, gritty, scaly macules    Biopsy-proven Hypertrophic Actinic Keratosis and additional AKs -right upper chest, extending to the peripheral margins, and scattered on chest and bilateral shoulders, respectively.  The pre-cancerous nature of these lesions and treatment options were discussed with the patient today.  At this time, I recommend treatment with  liquid nitrogen cryotherapy.  The patient expressed understanding, is in agreement with this plan, and wishes to proceed with cryotherapy today.    Destr of lesion - Right Breast  Complexity: simple    Destruction method: cryotherapy    Informed consent: discussed and consent obtained    Lesion destroyed using liquid nitrogen: Yes    Cryotherapy cycles:  1  Outcome: patient tolerated procedure well with no complications    Post-procedure details: wound care instructions given      2. Neoplasm of uncertain behavior of skin    Related Procedures  Follow Up In Dermatology    3. Melanocytic nevus of trunk  Scattered on the patient's face, neck, chest, and bilateral upper extremities, there are multiple small, round- to oval-shaped, brown-pigmented and pink-colored, symmetric, uniform-appearing macules and dome-shaped papules    Clinically benign- to slightly atypical-appearing nevi - the clinically benign- to slightly atypical-appearing nature of the patient's nevi was discussed with the patient today.  None of the patient's nevi meet threshold for biopsy today.  I emphasized the importance of performing monthly self-skin exams using the ABCDs of monitoring moles, which were reviewed with the patient today and an informational hand-out provided.  I also emphasized the importance of sun avoidance and sun protection with daily sunscreen use.  The patient expressed understanding and is in agreement with this plan.    4. Seborrheic keratosis  Scattered on the patient's face, neck, chest, and bilateral upper extremities, there are multiple tan- to light brown-colored, hyperkeratotic, stuck-on appearing papules of varying size and shape    Seborrheic Keratoses - the benign nature of these lesions was discussed with the patient today and reassurance provided.  No treatment is medically indicated for these lesions at this time.    5. Lentigo  Photodistributed  Multiple tan- to light brown-colored, round- to oval-shaped,  symmetric and uniform-appearing macules and small patches consistent with lentigines scattered in sun-exposed areas.    Solar Lentigines and photodamage.  The clinically benign-appearing nature of these lesions and their relation to chronic sun exposure were discussed with the patient today and reassurance provided.  None of these lesions meet threshold for biopsy today, and thus no treatment is medically indicated for these lesions at this time.  The signs and symptoms of skin cancer were reviewed and the patient was advised to practice sun protection and sun avoidance, use daily sunscreen, and perform regular self skin exams.  The patient was instructed to monitor these lesions for any changes, such as in size, shape, or color, or associated symptoms and to call our office to schedule a return visit for re-evaluation if any such changes or symptoms are noticed in the future.  The patient expressed understanding and is in agreement with this plan.    6. Diffuse photodamage of skin  Diffuse photodamage with actinic changes with telangiectasia and mottled pigmentation in sun-exposed areas.    History of dysplastic nevus and actinic keratoses and photodamage.  The patient was recently seen in our office for routine follow-up and skin exam on 11/7/23, at which time no evidence of recurrence was noted.  I emphasized the importance of continuing to perform monthly self-skin exams using the ABCDs of monitoring moles, which were reviewed with the patient, as well as the importance of sun avoidance and sun protection with daily sunscreen use.  I will have the patient return to my office in 6 to 12 months for melanoma follow-up and skin exam, and the patient was instructed to call our office should the patient notice any new, changing, symptomatic, or otherwise concerning skin lesions before then.  The patient expressed understanding and is in agreement with this plan.

## 2023-11-17 ENCOUNTER — APPOINTMENT (OUTPATIENT)
Dept: PHYSICAL THERAPY | Facility: CLINIC | Age: 68
End: 2023-11-17
Payer: MEDICARE

## 2023-11-17 ENCOUNTER — TREATMENT (OUTPATIENT)
Dept: PHYSICAL THERAPY | Facility: CLINIC | Age: 68
End: 2023-11-17
Payer: MEDICARE

## 2023-11-17 DIAGNOSIS — M54.50 ACUTE BILATERAL LOW BACK PAIN: ICD-10-CM

## 2023-11-17 DIAGNOSIS — M54.40 ACUTE BILATERAL LOW BACK PAIN WITH SCIATICA: Primary | ICD-10-CM

## 2023-11-17 PROCEDURE — 97110 THERAPEUTIC EXERCISES: CPT | Mod: GP | Performed by: PHYSICAL THERAPIST

## 2023-11-17 PROCEDURE — 97140 MANUAL THERAPY 1/> REGIONS: CPT | Mod: GP | Performed by: PHYSICAL THERAPIST

## 2023-11-17 PROCEDURE — 97014 ELECTRIC STIMULATION THERAPY: CPT | Mod: GP | Performed by: PHYSICAL THERAPIST

## 2023-11-17 ASSESSMENT — PAIN - FUNCTIONAL ASSESSMENT: PAIN_FUNCTIONAL_ASSESSMENT: 0-10

## 2023-11-17 NOTE — PROGRESS NOTES
"Physical Therapy Evaluation    Patient Name: Miguel May  MRN: [id number]  Today's Date: 11/17/2023  Visit:  2  Referred by: Dr. Arroyo  Diagnosis: acute LBP  Time in 0200  Time out 0310  Time calculation 70 mins    SUBJECTIVE:  Pt reports that he is frustrated with persistent LBP and right LE pain. Pain seems aggravated by forward bending activities and he has difficulty transitioning from sitting to standing. Pain is limiting daily and recreational activities. No paresthesias.    Has Hx of recurrent LBP.  XR- 2019- moderate disc space narrowing L5-S1 and mild L4-5  Facet joint hypertrophy at same levels.    Home Living: no issues    Prior level of function:  No limitations    OBJECTIVE:  Gait: slow and antalgic. Has difficulty transitioning from sit to stand due to back pain.      ASSESSMENT:  Pt reported decreased pain after E-stim, Graston and press ups. Will increase frequency of press ups to 4X/day and monitor response    TREATMENT:  EXERCISES Date 11/17/23 Date Date Date    REPS REPS REPS REPS          Child's pose 30\" X 5      Prone prop 2 mins      Prone press up       Walker hip flexor stretch 45\" ea      Sitting hamstring stretch 15\" X 5                    HP and ES lumbar  15 mins             Graston  15 mins                                                                                                                                           HEP                    PATIENT EDUCATION: HEP    PLAN:   Daily HEP with extension routine of KELECHI x 3 min. And Press-ups x 10 prior to rising.  EIS throughout day.  Maintenance of lordosis with sitting.  Other ex's 1x/day.  Rehab potential: fair to good  Plan of care agreement:Y    GOALS:  Active       PT Problem       Pt. will have decreased reports of pain by at least 2 levels using a VAS       Start:  11/01/23    Expected End:  12/29/23            Pt. will have improved score on Oswestry by at least 15%        Start:  11/01/23    Expected End:  12/29/23       "      Pt. will have improved pain free AROM to assist with improving functional tolerances       Start:  11/01/23    Expected End:  12/29/23            Pt. will have improved LE and core strength to assist with improving functional tolerances       Start:  11/01/23    Expected End:  12/29/23            Pt. will be able to stand and walk for longer periods        Start:  11/01/23    Expected End:  12/29/23

## 2023-11-20 ENCOUNTER — TREATMENT (OUTPATIENT)
Dept: PHYSICAL THERAPY | Facility: CLINIC | Age: 68
End: 2023-11-20
Payer: MEDICARE

## 2023-11-20 DIAGNOSIS — M54.40 ACUTE BILATERAL LOW BACK PAIN WITH SCIATICA: Primary | ICD-10-CM

## 2023-11-20 DIAGNOSIS — M54.50 ACUTE BILATERAL LOW BACK PAIN: ICD-10-CM

## 2023-11-20 PROCEDURE — 97110 THERAPEUTIC EXERCISES: CPT | Mod: GP | Performed by: PHYSICAL THERAPIST

## 2023-11-20 PROCEDURE — 97014 ELECTRIC STIMULATION THERAPY: CPT | Mod: GP | Performed by: PHYSICAL THERAPIST

## 2023-11-20 PROCEDURE — 97140 MANUAL THERAPY 1/> REGIONS: CPT | Mod: GP | Performed by: PHYSICAL THERAPIST

## 2023-11-20 ASSESSMENT — PAIN - FUNCTIONAL ASSESSMENT
PAIN_FUNCTIONAL_ASSESSMENT: 0-10
PAIN_FUNCTIONAL_ASSESSMENT: 0-10

## 2023-11-20 NOTE — PROGRESS NOTES
"Physical Therapy Evaluation    Patient Name: Miguel May  MRN: [id number]  Today's Date: 11/20/2023  Visit:  3  Referred by: Dr. Arroyo  Diagnosis: acute LBP  Time in 0947  Time out 103*9  Total time 52 mins  SUBJECTIVE:  Pt reports that he had continued back pain over the weekend. Felt that it was worse on Saturday when he did 3 sets of all of his exercises. Also noticed it when walking uphill.    Has Hx of recurrent LBP.  XR- 2019- moderate disc space narrowing L5-S1 and mild L4-5  Facet joint hypertrophy at same levels          OBJECTIVE:  Gait: slow and antalgic. Has difficulty transitioning from sit to stand due to back pain.      ASSESSMENT:      TREATMENT:  EXERCISES Date 11/17/23 Date 11/20/23 Date Date    REPS REPS REPS REPS          Child's pose 30\" X 5      Prone prop 2 mins      Prone press up  5 X 5\"     Walker hip flexor stretch 45\" ea      Sitting hamstring stretch 15\" X 5                    HP and ES lumbar  15 mins 15 mins            Graston  15 mins 15 mins            Back extension machine  60#   3 X 10     Mid rows   Black 30X     Pull downs  Black 30X                                                                                                              HEP                    PATIENT EDUCATION: HEP    PLAN:   Daily HEP with extension routine of KELECHI x 3 min. And Press-ups x 10 prior to rising.  EIS throughout day.  Maintenance of lordosis with sitting.  Other ex's 1x/day.  Rehab potential: fair to good  Plan of care agreement:Y    GOALS:  Active       PT Problem       Pt. will have decreased reports of pain by at least 2 levels using a VAS       Start:  11/01/23    Expected End:  12/29/23            Pt. will have improved score on Oswestry by at least 15%        Start:  11/01/23    Expected End:  12/29/23            Pt. will have improved pain free AROM to assist with improving functional tolerances       Start:  11/01/23    Expected End:  12/29/23            Pt. will have improved LE and " core strength to assist with improving functional tolerances       Start:  11/01/23    Expected End:  12/29/23            Pt. will be able to stand and walk for longer periods        Start:  11/01/23    Expected End:  12/29/23

## 2023-12-01 ENCOUNTER — APPOINTMENT (OUTPATIENT)
Dept: PHYSICAL THERAPY | Facility: CLINIC | Age: 68
End: 2023-12-01
Payer: MEDICARE

## 2023-12-01 ENCOUNTER — TREATMENT (OUTPATIENT)
Dept: PHYSICAL THERAPY | Facility: CLINIC | Age: 68
End: 2023-12-01
Payer: MEDICARE

## 2023-12-01 DIAGNOSIS — M54.40 ACUTE BILATERAL LOW BACK PAIN WITH SCIATICA: Primary | ICD-10-CM

## 2023-12-01 DIAGNOSIS — M54.50 ACUTE BILATERAL LOW BACK PAIN: ICD-10-CM

## 2023-12-01 PROCEDURE — 97140 MANUAL THERAPY 1/> REGIONS: CPT | Mod: GP | Performed by: PHYSICAL THERAPIST

## 2023-12-01 PROCEDURE — 97014 ELECTRIC STIMULATION THERAPY: CPT | Mod: GP | Performed by: PHYSICAL THERAPIST

## 2023-12-01 PROCEDURE — 97110 THERAPEUTIC EXERCISES: CPT | Mod: GP | Performed by: PHYSICAL THERAPIST

## 2023-12-01 ASSESSMENT — PAIN SCALES - GENERAL: PAINLEVEL_OUTOF10: 2

## 2023-12-01 ASSESSMENT — PAIN - FUNCTIONAL ASSESSMENT: PAIN_FUNCTIONAL_ASSESSMENT: 0-10

## 2023-12-01 NOTE — PROGRESS NOTES
"Physical Therapy Evaluation    Patient Name: Miguel May  MRN: [id number]  Today's Date: 12/1/2023  Visit:  4  Referred by: Dr. Arroyo  Diagnosis: acute LBP  Time in 1035  Time out 1130  Total time  mins 55 mins  SUBJECTIVE:  Pt reports that he was putting up Adam lights yesterday and experienced some back pain. Pt states he is unsure of pattern of what provokes pain.    Has Hx of recurrent LBP.  XR- 2019- moderate disc space narrowing L5-S1 and mild L4-5  Facet joint hypertrophy at same levels    Pain: 2/10 lumbar        OBJECTIVE:   Has difficulty transitioning from sit to stand due to back pain.      ASSESSMENT:  Pt continues to experience intermittent LBP. Doing well with progression of core strengthening. Pt is also swimming and lifting weights with machines at his own gym without increased LBP.    TREATMENT:  EXERCISES Date 11/17/23 Date 11/20/23 Date 12/1/23 Date    REPS REPS REPS REPS          Child's pose 30\" X 5      Prone prop 2 mins      Prone press up  5 X 5\"     Walker hip flexor stretch 45\" ea      Sitting hamstring stretch 15\" X 5                    HP and ES lumbar  15 mins 15 mins 15 mins           Graston  15 mins 15 mins 15 mins           Back extension machine  60#   3 X 10 70#   3 X 15    Mid rows   Black 30X BLK 30X    Pull downs  Black 30X BLK 30X    Sidestep with band   Blue 5X ea    Trunk flexion with ball   10 ea                                                                                               HEP                    PATIENT EDUCATION: HEP    PLAN:   Daily HEP with extension routine of KELECHI x 3 min. And Press-ups x 10 prior to rising.  EIS throughout day.  Maintenance of lordosis with sitting.  Other ex's 1x/day.  Rehab potential: fair to good  Plan of care agreement:Y    GOALS:  Active       PT Problem       Pt. will have decreased reports of pain by at least 2 levels using a VAS       Start:  11/01/23    Expected End:  12/29/23            Pt. will have improved score on " Oswestry by at least 15%        Start:  11/01/23    Expected End:  12/29/23            Pt. will have improved pain free AROM to assist with improving functional tolerances       Start:  11/01/23    Expected End:  12/29/23            Pt. will have improved LE and core strength to assist with improving functional tolerances       Start:  11/01/23    Expected End:  12/29/23            Pt. will be able to stand and walk for longer periods        Start:  11/01/23    Expected End:  12/29/23

## 2023-12-03 ENCOUNTER — HOSPITAL ENCOUNTER (OUTPATIENT)
Dept: RADIOLOGY | Facility: HOSPITAL | Age: 68
Discharge: HOME | End: 2023-12-03
Payer: MEDICARE

## 2023-12-03 DIAGNOSIS — E78.5 HYPERLIPIDEMIA, UNSPECIFIED HYPERLIPIDEMIA TYPE: ICD-10-CM

## 2023-12-03 PROCEDURE — 75571 CT HRT W/O DYE W/CA TEST: CPT

## 2023-12-06 ENCOUNTER — TREATMENT (OUTPATIENT)
Dept: PHYSICAL THERAPY | Facility: CLINIC | Age: 68
End: 2023-12-06
Payer: MEDICARE

## 2023-12-06 DIAGNOSIS — M54.50 ACUTE BILATERAL LOW BACK PAIN: ICD-10-CM

## 2023-12-06 DIAGNOSIS — M54.40 ACUTE BILATERAL LOW BACK PAIN WITH SCIATICA: Primary | ICD-10-CM

## 2023-12-06 PROCEDURE — 97110 THERAPEUTIC EXERCISES: CPT | Mod: GP | Performed by: PHYSICAL THERAPIST

## 2023-12-06 PROCEDURE — 97140 MANUAL THERAPY 1/> REGIONS: CPT | Mod: GP | Performed by: PHYSICAL THERAPIST

## 2023-12-06 PROCEDURE — 97014 ELECTRIC STIMULATION THERAPY: CPT | Mod: GP | Performed by: PHYSICAL THERAPIST

## 2023-12-06 ASSESSMENT — PAIN - FUNCTIONAL ASSESSMENT: PAIN_FUNCTIONAL_ASSESSMENT: 0-10

## 2023-12-06 ASSESSMENT — PAIN SCALES - GENERAL: PAINLEVEL_OUTOF10: 3

## 2023-12-06 NOTE — PROGRESS NOTES
"Physical Therapy Evaluation    Patient Name: Miguel May  MRN: [id number]  Today's Date: 12/6/2023  Visit:  5  Referred by: Dr. Arroyo  Diagnosis: acute LBP  Time in 245  Time out   Total time  mins   mins  SUBJECTIVE:  Pt reports that he took and exercise class today and played pickleball and his back is a little sore,but not too bad. Continues to have right sided back pain when he gets into his car.    Has Hx of recurrent LBP.  XR- 2019- moderate disc space narrowing L5-S1 and mild L4-5  Facet joint hypertrophy at same levels    Pain: 3/10 lumbar        OBJECTIVE:   Has difficulty transitioning from sit to stand due to back pain.      ASSESSMENT:  Pt continues to experience intermittent LBP with transitional movements. Doing well with progression of strengthening.    TREATMENT:  EXERCISES Date 11/17/23 Date 11/20/23 Date 12/1/23 Date12/6/23    REPS REPS REPS REPS          Child's pose 30\" X 5      Prone prop 2 mins      Prone press up  5 X 5\"     Walker hip flexor stretch 45\" ea      Sitting hamstring stretch 15\" X 5                    HP and ES lumbar  15 mins 15 mins 15 mins 15 mins          Graston  15 mins 15 mins 15 mins 15 mins          Back extension machine  60#   3 X 10 70#   3 X 15 70#  3 X 15   Mid rows   Black 30X BLK 30X    Pull downs  Black 30X BLK 30X    Sidestep with band   Blue 5X ea    Trunk flexion with ball   10 ea 10X ea   Abdominal curl    50#  3 X 10   Diagonal pull down-hoist    15#  20X   Diagonal pull up-hoist    15# 20X                                                                         HEP                    PATIENT EDUCATION: HEP    PLAN:    Maintenance of lordosis with sitting. Progress core strengthening.  Rehab potential: fair to good  Plan of care agreement:Y    GOALS:  Active       PT Problem       Pt. will have decreased reports of pain by at least 2 levels using a VAS       Start:  11/01/23    Expected End:  12/29/23            Pt. will have improved score on Oswestry by " at least 15%        Start:  11/01/23    Expected End:  12/29/23            Pt. will have improved pain free AROM to assist with improving functional tolerances       Start:  11/01/23    Expected End:  12/29/23            Pt. will have improved LE and core strength to assist with improving functional tolerances       Start:  11/01/23    Expected End:  12/29/23            Pt. will be able to stand and walk for longer periods        Start:  11/01/23    Expected End:  12/29/23

## 2023-12-07 ENCOUNTER — TELEPHONE (OUTPATIENT)
Dept: PULMONOLOGY | Facility: CLINIC | Age: 68
End: 2023-12-07
Payer: MEDICARE

## 2023-12-07 ENCOUNTER — DOCUMENTATION (OUTPATIENT)
Dept: PULMONOLOGY | Facility: HOSPITAL | Age: 68
End: 2023-12-07
Payer: MEDICARE

## 2023-12-07 DIAGNOSIS — J45.901 ASTHMA WITH ACUTE EXACERBATION, UNSPECIFIED ASTHMA SEVERITY, UNSPECIFIED WHETHER PERSISTENT (HHS-HCC): Primary | ICD-10-CM

## 2023-12-07 RX ORDER — PREDNISONE 10 MG/1
TABLET ORAL
Qty: 30 TABLET | Refills: 0 | Status: SHIPPED | OUTPATIENT
Start: 2023-12-07 | End: 2024-01-06

## 2023-12-07 RX ORDER — AMOXICILLIN AND CLAVULANATE POTASSIUM 500; 125 MG/1; MG/1
500 TABLET, FILM COATED ORAL 2 TIMES DAILY
Qty: 20 TABLET | Refills: 0 | Status: SHIPPED | OUTPATIENT
Start: 2023-12-07 | End: 2023-12-17

## 2023-12-07 NOTE — TELEPHONE ENCOUNTER
Patient states he always have a bronchial inflammation around this time of year, and want to know if    you could send him a script for amoxicillin 125 and prednisone 10mg .     Please advise

## 2023-12-07 NOTE — PROGRESS NOTES
Patient having typical flare of his asthmatic bronchitis.  Will place on a course of Augmentin and prednisone.  He will keep me posted.

## 2023-12-08 ENCOUNTER — APPOINTMENT (OUTPATIENT)
Dept: PHYSICAL THERAPY | Facility: CLINIC | Age: 68
End: 2023-12-08
Payer: MEDICARE

## 2023-12-08 ENCOUNTER — TREATMENT (OUTPATIENT)
Dept: PHYSICAL THERAPY | Facility: CLINIC | Age: 68
End: 2023-12-08
Payer: MEDICARE

## 2023-12-08 DIAGNOSIS — M54.40 ACUTE BILATERAL LOW BACK PAIN WITH SCIATICA: Primary | ICD-10-CM

## 2023-12-08 PROCEDURE — 97140 MANUAL THERAPY 1/> REGIONS: CPT | Mod: GP | Performed by: PHYSICAL THERAPIST

## 2023-12-08 PROCEDURE — 97110 THERAPEUTIC EXERCISES: CPT | Mod: GP | Performed by: PHYSICAL THERAPIST

## 2023-12-08 PROCEDURE — 97014 ELECTRIC STIMULATION THERAPY: CPT | Mod: GP | Performed by: PHYSICAL THERAPIST

## 2023-12-08 ASSESSMENT — PAIN SCALES - GENERAL: PAINLEVEL_OUTOF10: 2

## 2023-12-08 ASSESSMENT — PAIN - FUNCTIONAL ASSESSMENT: PAIN_FUNCTIONAL_ASSESSMENT: 0-10

## 2023-12-08 NOTE — PROGRESS NOTES
Physical Therapy Evaluation    Patient Name: Miguel May  MRN: [id number]  Today's Date: 12/8/2023  Visit:  5  Referred by: Dr. Arroyo  Diagnosis: acute LBP    Visit: 6  Time in 0900  Time out 0955  Total time  55 mins  SUBJECTIVE:  Pt reports that he is feeling pretty good today, mostly stiff in low back because he hasn't had time to do all of his exercises this morning.    Has Hx of recurrent LBP.  XR- 2019- moderate disc space narrowing L5-S1 and mild L4-5  Facet joint hypertrophy at same levels    Pain: 2/10 lumbar        OBJECTIVE:   Has difficulty transitioning from sit to stand due to back pain.      ASSESSMENT:  Decreased pain levels today, but pain continues to be aggravated by standing and walking.    TREATMENT:  EXERCISES 12/8/23                    Child's pose       Prone prop       Prone press up       Walker hip flexor stretch       Sitting hamstring stretch                     HP and ES lumbar 15 mins             Graston  15 mins             Back extension machine 70#  3 X 15      Mid rows        Pull downs       Sidestep with band       Trunk flexion with ball 10X      Abdominal curl 50#  3 X 10      Diagonal pull down-hoist 15#  20X      Diagonal pull up-hoist 15#   20X                                                                            HEP                    PATIENT EDUCATION: HEP    PLAN:    Maintenance of lordosis with sitting. Progress core strengthening.  Rehab potential: fair to good  Plan of care agreement:Y    GOALS:  Active       PT Problem       Pt. will have decreased reports of pain by at least 2 levels using a VAS       Start:  11/01/23    Expected End:  12/29/23            Pt. will have improved score on Oswestry by at least 15%        Start:  11/01/23    Expected End:  12/29/23            Pt. will have improved pain free AROM to assist with improving functional tolerances       Start:  11/01/23    Expected End:  12/29/23            Pt. will have improved LE and core strength  to assist with improving functional tolerances       Start:  11/01/23    Expected End:  12/29/23            Pt. will be able to stand and walk for longer periods        Start:  11/01/23    Expected End:  12/29/23

## 2023-12-13 ENCOUNTER — TREATMENT (OUTPATIENT)
Dept: PHYSICAL THERAPY | Facility: CLINIC | Age: 68
End: 2023-12-13
Payer: MEDICARE

## 2023-12-13 DIAGNOSIS — M54.40 ACUTE BILATERAL LOW BACK PAIN WITH SCIATICA: Primary | ICD-10-CM

## 2023-12-13 PROCEDURE — 97140 MANUAL THERAPY 1/> REGIONS: CPT | Mod: GP | Performed by: PHYSICAL THERAPIST

## 2023-12-13 PROCEDURE — 97014 ELECTRIC STIMULATION THERAPY: CPT | Mod: GP | Performed by: PHYSICAL THERAPIST

## 2023-12-13 PROCEDURE — 97110 THERAPEUTIC EXERCISES: CPT | Mod: GP | Performed by: PHYSICAL THERAPIST

## 2023-12-13 ASSESSMENT — PAIN - FUNCTIONAL ASSESSMENT: PAIN_FUNCTIONAL_ASSESSMENT: 0-10

## 2023-12-13 ASSESSMENT — PAIN SCALES - GENERAL: PAINLEVEL_OUTOF10: 2

## 2023-12-13 NOTE — PROGRESS NOTES
Physical Therapy Evaluation    Patient Name: Miguel May  MRN: [id number]  Today's Date: 12/13/2023  Visit:  5  Referred by: Dr. Arroyo  Diagnosis: acute LBP    Visit: 7  Time in 1000  Time out 1106  Total time   66 mins  SUBJECTIVE:  Pt arrived 15 minutes late for his appt.  Pt reports his back is more stiff than painful today.    Has Hx of recurrent LBP.  XR- 2019- moderate disc space narrowing L5-S1 and mild L4-5  Facet joint hypertrophy at same levels    Pain: 2-3/10 lumbar        OBJECTIVE:   Has difficulty transitioning from sit to stand due to back pain.      ASSESSMENT:  Pain levels holding in 2-3/10 range, but continue to affect patient's ability to perform recreational and functional activities.     TREATMENT:  EXERCISES 12/8/23 12/13/23                   Child's pose       Prone prop       Prone press up       Walker hip flexor stretch       Sitting hamstring stretch                     HP and ES lumbar 15 mins 15 mins            Graston  15 mins 15 mins            Back extension machine 70#  3 X 15 70#  3 X 15     Mid rows   BLK 30X     Pull downs  BLK  30X     Sidestep with band  BLK 5X ea     Trunk flexion with ball 10X      Abdominal curl 50#  3 X 10 50#  3 X 10     Diagonal pull down-hoist 15#  20X 15#   20X     Diagonal pull up-hoist 15#   20X 15#  20X                                                                           HEP                    PATIENT EDUCATION: HEP    PLAN:    Maintenance of lordosis with sitting. Progress core strengthening.  Rehab potential: fair to good  Plan of care agreement:Y    GOALS:  Active       PT Problem       Pt. will have decreased reports of pain by at least 2 levels using a VAS       Start:  11/01/23    Expected End:  12/29/23            Pt. will have improved score on Oswestry by at least 15%        Start:  11/01/23    Expected End:  12/29/23            Pt. will have improved pain free AROM to assist with improving functional tolerances       Start:  11/01/23     Expected End:  12/29/23            Pt. will have improved LE and core strength to assist with improving functional tolerances       Start:  11/01/23    Expected End:  12/29/23            Pt. will be able to stand and walk for longer periods        Start:  11/01/23    Expected End:  12/29/23

## 2023-12-15 ENCOUNTER — TREATMENT (OUTPATIENT)
Dept: PHYSICAL THERAPY | Facility: CLINIC | Age: 68
End: 2023-12-15
Payer: MEDICARE

## 2023-12-15 DIAGNOSIS — M54.40 ACUTE BILATERAL LOW BACK PAIN WITH SCIATICA: ICD-10-CM

## 2023-12-15 PROCEDURE — 97140 MANUAL THERAPY 1/> REGIONS: CPT | Mod: GP | Performed by: PHYSICAL THERAPIST

## 2023-12-15 PROCEDURE — 97110 THERAPEUTIC EXERCISES: CPT | Mod: GP | Performed by: PHYSICAL THERAPIST

## 2023-12-15 PROCEDURE — 97014 ELECTRIC STIMULATION THERAPY: CPT | Mod: GP | Performed by: PHYSICAL THERAPIST

## 2023-12-15 ASSESSMENT — PAIN - FUNCTIONAL ASSESSMENT: PAIN_FUNCTIONAL_ASSESSMENT: 0-10

## 2023-12-15 ASSESSMENT — PAIN SCALES - GENERAL: PAINLEVEL_OUTOF10: 1

## 2023-12-15 NOTE — PROGRESS NOTES
Physical Therapy Evaluation    Patient Name: Miguel May  MRN: [id number]  Today's Date: 12/15/2023  Visit:  5  Referred by: Dr. Arroyo  Diagnosis: acute LBP    Visit: 8  Time in 200  Time out 300  Total time  60 mins  SUBJECTIVE:  Pt reports that his back is feeling good today. Reports he went for 2 hour walk and did his home exercises. Plans on trying to golf this afternoon.    Has Hx of recurrent LBP.  XR- 2019- moderate disc space narrowing L5-S1 and mild L4-5  Facet joint hypertrophy at same levels    Pain: 0-1/10 lumbar        OBJECTIVE:   Has difficulty transitioning from sit to stand due to back pain.      ASSESSMENT:  Pain level improved today and able to increase resistance with exercises.    TREATMENT:  EXERCISES 12/8/23 12/13/23 12/15/2023                  Child's pose       Prone prop       Prone press up       Walker hip flexor stretch       Sitting hamstring stretch                     HP and ES lumbar 15 mins 15 mins 15 mins           Graston  15 mins 15 mins 15 mins           Back extension machine 70#  3 X 15 70#  3 X 15 70#  3 X 15    Mid rows   BLK 30X BLK 30X    Pull downs  BLK  30X BLK 30X    Sidestep with band  BLK 5X ea BLK 5 ea    Trunk flexion with ball 10X      Abdominal curl 50#  3 X 10 50#  3 X 10 50#  3 X 10    Diagonal pull down-hoist 15#  20X 15#   20X 20#  20X    Diagonal pull up-hoist 15#   20X 15#  20X 15#  20X                                                                          HEP                    PATIENT EDUCATION: HEP    PLAN:    Maintenance of lordosis with sitting. Progress core strengthening.  Rehab potential: fair to good  Plan of care agreement:Y    GOALS:  Active       PT Problem       Pt. will have decreased reports of pain by at least 2 levels using a VAS       Start:  11/01/23    Expected End:  12/29/23            Pt. will have improved score on Oswestry by at least 15%        Start:  11/01/23    Expected End:  12/29/23            Pt. will have improved pain  free AROM to assist with improving functional tolerances       Start:  11/01/23    Expected End:  12/29/23            Pt. will have improved LE and core strength to assist with improving functional tolerances       Start:  11/01/23    Expected End:  12/29/23            Pt. will be able to stand and walk for longer periods        Start:  11/01/23    Expected End:  12/29/23

## 2023-12-18 ENCOUNTER — TREATMENT (OUTPATIENT)
Dept: PHYSICAL THERAPY | Facility: CLINIC | Age: 68
End: 2023-12-18
Payer: MEDICARE

## 2023-12-18 DIAGNOSIS — M54.40 ACUTE BILATERAL LOW BACK PAIN WITH SCIATICA: Primary | ICD-10-CM

## 2023-12-18 PROCEDURE — 97110 THERAPEUTIC EXERCISES: CPT | Mod: GP | Performed by: PHYSICAL THERAPIST

## 2023-12-18 PROCEDURE — 97014 ELECTRIC STIMULATION THERAPY: CPT | Mod: GP | Performed by: PHYSICAL THERAPIST

## 2023-12-18 PROCEDURE — 97140 MANUAL THERAPY 1/> REGIONS: CPT | Mod: GP | Performed by: PHYSICAL THERAPIST

## 2023-12-18 NOTE — PROGRESS NOTES
Physical Therapy                                                                         Physical Therapy Treatment    Patient Name: Miguel May  MRN: [id number]  Today's Date: 12/18/2023    Referred by: Dr. Arroyo  Diagnosis: acute LBP    Visit: 9  Time in 945  Time out 1105  Total time  80 mins  SUBJECTIVE:  Pt reports that his back pain has been around a 3/10 and he continues to notice stiffness. Pt continues to experience stiffness in his back when transitioning from sitting to standing and he is limited when trying to  objects due to his back pain. Pt wondering if it's time to see a spine specialist or pain management for an injection.    Has Hx of recurrent LBP.  XR- 2019- moderate disc space narrowing L5-S1 and mild L4-5  Facet joint hypertrophy at same levels    Pain: 0-3/10 lumbar        OBJECTIVE:   Has difficulty transitioning from sit to stand due to back pain.      ASSESSMENT:  Overall improvement in back pain level, but patient continues to have some limitations due to his pain. Pt will contact his primary care physician to discuss further imaging or referral to spine specialist or pain management.    TREATMENT:  EXERCISES 12/8/23 12/13/23 12/15/2023 12/18/23                 Child's pose       Prone prop       Prone press up       Walker hip flexor stretch       Sitting hamstring stretch                     HP and ES lumbar 15 mins 15 mins 15 mins 20 mins          Graston  15 mins 15 mins 15 mins 15 mins          Back extension machine 70#  3 X 15 70#  3 X 15 70#  3 X 15 70#  3 X 15   Mid rows   BLK 30X BLK 30X BLK 30X   Pull downs  BLK  30X BLK 30X BLK 30X   Sidestep with band  BLK 5X ea BLK 5 ea BLK 30X   Trunk flexion with ball 10X      Abdominal curl 50#  3 X 10 50#  3 X 10 50#  3 X 10 50#  3 X 10   Diagonal pull down-hoist 15#  20X 15#   20X 20#  20X 20#  20X   Diagonal pull up-hoist 15#   20X 15#  20X 15#  20X 15#  20X                                                                          HEP                    PATIENT EDUCATION: HEP    PLAN:    Maintenance of lordosis with sitting. Progress core strengthening.  Rehab potential: fair to good  Plan of care agreement:Y    GOALS:  Active       PT Problem       Pt. will have decreased reports of pain by at least 2 levels using a VAS       Start:  11/01/23    Expected End:  12/29/23            Pt. will have improved score on Oswestry by at least 15%        Start:  11/01/23    Expected End:  12/29/23            Pt. will have improved pain free AROM to assist with improving functional tolerances       Start:  11/01/23    Expected End:  12/29/23            Pt. will have improved LE and core strength to assist with improving functional tolerances       Start:  11/01/23    Expected End:  12/29/23            Pt. will be able to stand and walk for longer periods        Start:  11/01/23    Expected End:  12/29/23

## 2023-12-20 ENCOUNTER — TREATMENT (OUTPATIENT)
Dept: PHYSICAL THERAPY | Facility: CLINIC | Age: 68
End: 2023-12-20
Payer: MEDICARE

## 2023-12-20 DIAGNOSIS — M54.40 ACUTE BILATERAL LOW BACK PAIN WITH SCIATICA: ICD-10-CM

## 2023-12-20 PROCEDURE — 97014 ELECTRIC STIMULATION THERAPY: CPT | Mod: GP | Performed by: PHYSICAL THERAPIST

## 2023-12-20 PROCEDURE — 97140 MANUAL THERAPY 1/> REGIONS: CPT | Mod: GP | Performed by: PHYSICAL THERAPIST

## 2023-12-20 PROCEDURE — 97110 THERAPEUTIC EXERCISES: CPT | Mod: GP | Performed by: PHYSICAL THERAPIST

## 2023-12-20 ASSESSMENT — PAIN - FUNCTIONAL ASSESSMENT: PAIN_FUNCTIONAL_ASSESSMENT: 0-10

## 2023-12-20 ASSESSMENT — PAIN SCALES - GENERAL: PAINLEVEL_OUTOF10: 3

## 2023-12-20 NOTE — PROGRESS NOTES
Physical Therapy                                                                         Physical Therapy Treatment    Patient Name: Miguel May  MRN: [id number]  Today's Date: 12/20/2023    Referred by: Dr. Arroyo  Diagnosis: acute LBP    Visit: 10  Time in 945  Time out 1053  Total time  68 mins  SUBJECTIVE:  Pt reports that he went to a golf simulator and worked out yesterday and his back has been sore since then.    Has Hx of recurrent LBP.  XR- 2019- moderate disc space narrowing L5-S1 and mild L4-5  Facet joint hypertrophy at same levels    Pain: 3/10 lumbar        OBJECTIVE:   Has difficulty transitioning from sit to stand due to back pain.      ASSESSMENT:  Pt had mild pain with abdominal curl machine, but was able to complete all other exercises without increased pain.    TREATMENT:  EXERCISES 12/20/23                    Child's pose       Prone prop       Prone press up       Walker hip flexor stretch       Sitting hamstring stretch                     HP and ES lumbar 15 mins             Graston  15 mins             Back extension machine 70#  3 X 15      Mid rows  BLK 30X      Pull downs BLK 30X      Sidestep with band BLK 30X      Trunk flexion with ball 10X      Abdominal curl 50#  3 X 10      Diagonal pull down-hoist 20#  20X      Diagonal pull up-hoist 15#   20X                                                                            HEP                    PATIENT EDUCATION: HEP    PLAN:    Maintenance of lordosis with sitting. Progress core strengthening.  Rehab potential: fair to good  Plan of care agreement:Y    GOALS:  Active       PT Problem       Pt. will have decreased reports of pain by at least 2 levels using a VAS       Start:  11/01/23    Expected End:  12/29/23            Pt. will have improved score on Oswestry by at least 15%        Start:  11/01/23    Expected End:  12/29/23            Pt. will have improved pain free AROM to assist with improving functional tolerances       Start:   11/01/23    Expected End:  12/29/23            Pt. will have improved LE and core strength to assist with improving functional tolerances       Start:  11/01/23    Expected End:  12/29/23            Pt. will be able to stand and walk for longer periods        Start:  11/01/23    Expected End:  12/29/23

## 2023-12-22 ENCOUNTER — LAB (OUTPATIENT)
Dept: LAB | Facility: LAB | Age: 68
End: 2023-12-22
Payer: MEDICARE

## 2023-12-22 DIAGNOSIS — D64.9 ANEMIA, UNSPECIFIED TYPE: ICD-10-CM

## 2023-12-22 LAB
ERYTHROCYTE [DISTWIDTH] IN BLOOD BY AUTOMATED COUNT: 14.7 % (ref 11.5–14.5)
HCT VFR BLD AUTO: 39.1 % (ref 41–52)
HGB BLD-MCNC: 12 G/DL (ref 13.5–17.5)
MCH RBC QN AUTO: 24.4 PG (ref 26–34)
MCHC RBC AUTO-ENTMCNC: 30.7 G/DL (ref 32–36)
MCV RBC AUTO: 80 FL (ref 80–100)
NRBC BLD-RTO: 0 /100 WBCS (ref 0–0)
PLATELET # BLD AUTO: 194 X10*3/UL (ref 150–450)
RBC # BLD AUTO: 4.92 X10*6/UL (ref 4.5–5.9)
WBC # BLD AUTO: 5.2 X10*3/UL (ref 4.4–11.3)

## 2023-12-22 PROCEDURE — 36415 COLL VENOUS BLD VENIPUNCTURE: CPT

## 2023-12-22 PROCEDURE — 85027 COMPLETE CBC AUTOMATED: CPT

## 2023-12-26 ENCOUNTER — APPOINTMENT (OUTPATIENT)
Dept: RADIOLOGY | Facility: CLINIC | Age: 68
End: 2023-12-26
Payer: MEDICARE

## 2023-12-27 ENCOUNTER — APPOINTMENT (OUTPATIENT)
Dept: PHYSICAL THERAPY | Facility: CLINIC | Age: 68
End: 2023-12-27
Payer: MEDICARE

## 2023-12-29 ENCOUNTER — APPOINTMENT (OUTPATIENT)
Dept: PHYSICAL THERAPY | Facility: CLINIC | Age: 68
End: 2023-12-29
Payer: MEDICARE

## 2024-01-03 ENCOUNTER — TREATMENT (OUTPATIENT)
Dept: PHYSICAL THERAPY | Facility: CLINIC | Age: 69
End: 2024-01-03
Payer: MEDICARE

## 2024-01-03 DIAGNOSIS — M54.40 ACUTE BILATERAL LOW BACK PAIN WITH SCIATICA: Primary | ICD-10-CM

## 2024-01-03 PROCEDURE — 97014 ELECTRIC STIMULATION THERAPY: CPT | Mod: GP | Performed by: PHYSICAL THERAPIST

## 2024-01-03 PROCEDURE — 97110 THERAPEUTIC EXERCISES: CPT | Mod: GP | Performed by: PHYSICAL THERAPIST

## 2024-01-03 PROCEDURE — 97140 MANUAL THERAPY 1/> REGIONS: CPT | Mod: GP | Performed by: PHYSICAL THERAPIST

## 2024-01-03 NOTE — PROGRESS NOTES
Physical Therapy                                                                             Physical Therapy Treatment/Reassessment    Patient Name: Miguel May  MRN: [id number]  Today's Date: 1/3/2024    Referred by: Dr. Arroyo  Diagnosis: acute LBP    Visit: 11  Time in 1000  Time out 1143  Total time   102 mins  SUBJECTIVE:  Pt reports that he had family in town the past two weeks and hasn't been able to exercise consistently during that time. Also was playing with grandkids which also seemed to aggravate his back pain. Yesterday back pain was very bad, 7-8/10, but it has decreased today.    Has Hx of recurrent LBP.  XR- 2019- moderate disc space narrowing L5-S1 and mild L4-5  Facet joint hypertrophy at same levels    Pain: 3/10 lumbar        OBJECTIVE:   Has difficulty transitioning from sit to stand due to back pain.    Lumbar ROM:  Trunk flexion: 45 degrees  Trunk extension: 11 degrees     LE strength: 5/5 bilat LE's    SLR:  Right: 57 degrees  Left: 75 degrees    Special Testing: Slump test + left leg    OUTCOME MEASURE:  MODIFIED VIKTOR 14%      ASSESSMENT:  Lumbar ROM and trunk strength are improving, but intermittent pain persists. Will continue PT 1X/week until patient leaves for [place].    TREATMENT:  EXERCISES 12/20/23  1/3/24                   Child's pose       Prone prop       Prone press up       Walker hip flexor stretch       Sitting hamstring stretch                     HP and ES lumbar 15 mins 15 mins            Graston  15 mins 15 mins            Back extension machine 70#  3 X 15 70#  3 X 15     Mid rows  BLK 30X BLK 30X     Pull downs BLK 30X BLK 30X     Sidestep with band BLK 10X BLK 10X     Trunk flexion with ball 10X      Abdominal curl 50#  3 X 10 50#  3 X 10     Diagonal pull down-hoist 20#  20X 20#  20X     Diagonal pull up-hoist 15#   20X 15#  20X                                                                           HEP                    PATIENT EDUCATION: HEP    PLAN:     Maintenance of lordosis with sitting. Progress core strengthening.  Rehab potential: fair to good  Plan of care agreement:Y    GOALS:  Active       PT Problem       Pt. will have decreased reports of pain by at least 2 levels using a VAS       Start:  11/01/23    Expected End:  12/29/23            Pt. will have improved score on Oswestry by at least 15%        Start:  11/01/23    Expected End:  12/29/23            Pt. will have improved pain free AROM to assist with improving functional tolerances       Start:  11/01/23    Expected End:  12/29/23            Pt. will have improved LE and core strength to assist with improving functional tolerances       Start:  11/01/23    Expected End:  12/29/23            Pt. will be able to stand and walk for longer periods        Start:  11/01/23    Expected End:  12/29/23

## 2024-01-07 DIAGNOSIS — M54.50 LOW BACK PAIN, UNSPECIFIED BACK PAIN LATERALITY, UNSPECIFIED CHRONICITY, UNSPECIFIED WHETHER SCIATICA PRESENT: Primary | ICD-10-CM

## 2024-01-10 ENCOUNTER — TREATMENT (OUTPATIENT)
Dept: PHYSICAL THERAPY | Facility: CLINIC | Age: 69
End: 2024-01-10
Payer: MEDICARE

## 2024-01-10 DIAGNOSIS — M54.40 ACUTE BILATERAL LOW BACK PAIN WITH SCIATICA: Primary | ICD-10-CM

## 2024-01-10 PROCEDURE — 97012 MECHANICAL TRACTION THERAPY: CPT | Mod: GP | Performed by: PHYSICAL THERAPIST

## 2024-01-10 PROCEDURE — 97014 ELECTRIC STIMULATION THERAPY: CPT | Mod: GP | Performed by: PHYSICAL THERAPIST

## 2024-01-10 PROCEDURE — 97140 MANUAL THERAPY 1/> REGIONS: CPT | Mod: GP | Performed by: PHYSICAL THERAPIST

## 2024-01-10 NOTE — PROGRESS NOTES
"Physical Therapy                                                                                 Physical Therapy Treatment    Patient Name: Miguel May  MRN: [id number]  Today's Date: 1/10/2024    Referred by: Dr. Arroyo  Diagnosis: acute LBP    Visit: 12  Time in 1000  Time out 1115  Total time  70  mins    SUBJECTIVE:  Pt reports that he has good and bad days regarding his back pain. Is noticing more good days.    Has Hx of recurrent LBP.  XR- 2019- moderate disc space narrowing L5-S1 and mild L4-5  Facet joint hypertrophy at same levels    Pain: 3/10 lumbar        OBJECTIVE:   Has difficulty transitioning from sit to stand due to back pain.    Lumbar ROM:  Trunk flexion: 45 degrees  Trunk extension: 11 degrees     LE strength: 5/5 bilat LE's    SLR:  Right: 57 degrees  Left: 75 degrees    Special Testing: Slump test + left leg    OUTCOME MEASURE:  MODIFIED VIKTOR 14%      ASSESSMENT:  Added lumbar traction today. Will monitor response to traction and continue if beneficial.    TREATMENT:  EXERCISES 12/20/23  1/3/24 1/10/24                  Child's pose       Prone prop       Prone press up       Walker hip flexor stretch       Sitting hamstring stretch       Lumbar traction-supine   50#/25#  15 mins. 30\"H,10\"R           HP and ES lumbar 15 mins 15 mins 15 mins           Graston  15 mins 15 mins 15 mins           Back extension machine 70#  3 X 15 70#  3 X 15     Mid rows  BLK 30X BLK 30X     Pull downs BLK 30X BLK 30X     Sidestep with band BLK 10X BLK 10X     Trunk flexion with ball 10X      Abdominal curl 50#  3 X 10 50#  3 X 10     Diagonal pull down-hoist 20#  20X 20#  20X     Diagonal pull up-hoist 15#   20X 15#  20X                                                                           HEP                    PATIENT EDUCATION: HEP    PLAN:    Maintenance of lordosis with sitting. Progress core strengthening.  Rehab potential: fair to good  Plan of care agreement:Y    GOALS:  Active       PT Problem       " Pt. will have decreased reports of pain by at least 2 levels using a VAS       Start:  11/01/23    Expected End:  12/29/23            Pt. will have improved score on Oswestry by at least 15%        Start:  11/01/23    Expected End:  12/29/23            Pt. will have improved pain free AROM to assist with improving functional tolerances       Start:  11/01/23    Expected End:  12/29/23            Pt. will have improved LE and core strength to assist with improving functional tolerances       Start:  11/01/23    Expected End:  12/29/23            Pt. will be able to stand and walk for longer periods        Start:  11/01/23    Expected End:  12/29/23

## 2024-01-16 ENCOUNTER — OFFICE VISIT (OUTPATIENT)
Dept: PRIMARY CARE | Facility: CLINIC | Age: 69
End: 2024-01-16
Payer: MEDICARE

## 2024-01-16 VITALS
TEMPERATURE: 98.4 F | DIASTOLIC BLOOD PRESSURE: 82 MMHG | BODY MASS INDEX: 29.66 KG/M2 | SYSTOLIC BLOOD PRESSURE: 112 MMHG | WEIGHT: 190.8 LBS

## 2024-01-16 DIAGNOSIS — D64.9 ANEMIA, UNSPECIFIED TYPE: Primary | ICD-10-CM

## 2024-01-16 DIAGNOSIS — E78.5 HYPERLIPIDEMIA, UNSPECIFIED HYPERLIPIDEMIA TYPE: ICD-10-CM

## 2024-01-16 DIAGNOSIS — E03.9 HYPOTHYROIDISM, UNSPECIFIED TYPE: ICD-10-CM

## 2024-01-16 DIAGNOSIS — K21.9 GASTROESOPHAGEAL REFLUX DISEASE, UNSPECIFIED WHETHER ESOPHAGITIS PRESENT: ICD-10-CM

## 2024-01-16 DIAGNOSIS — I10 BENIGN HYPERTENSION: ICD-10-CM

## 2024-01-16 PROCEDURE — 1036F TOBACCO NON-USER: CPT | Performed by: INTERNAL MEDICINE

## 2024-01-16 PROCEDURE — 3079F DIAST BP 80-89 MM HG: CPT | Performed by: INTERNAL MEDICINE

## 2024-01-16 PROCEDURE — 3074F SYST BP LT 130 MM HG: CPT | Performed by: INTERNAL MEDICINE

## 2024-01-16 PROCEDURE — 99214 OFFICE O/P EST MOD 30 MIN: CPT | Performed by: INTERNAL MEDICINE

## 2024-01-16 PROCEDURE — 1160F RVW MEDS BY RX/DR IN RCRD: CPT | Performed by: INTERNAL MEDICINE

## 2024-01-16 PROCEDURE — 1159F MED LIST DOCD IN RCRD: CPT | Performed by: INTERNAL MEDICINE

## 2024-01-16 PROCEDURE — 1125F AMNT PAIN NOTED PAIN PRSNT: CPT | Performed by: INTERNAL MEDICINE

## 2024-01-16 RX ORDER — AMLODIPINE BESYLATE 5 MG/1
5 TABLET ORAL DAILY
Qty: 90 TABLET | Refills: 3 | Status: SHIPPED | OUTPATIENT
Start: 2024-01-16 | End: 2024-01-22

## 2024-01-16 RX ORDER — PANTOPRAZOLE SODIUM 40 MG/1
40 TABLET, DELAYED RELEASE ORAL DAILY
Qty: 30 TABLET | Refills: 2 | Status: SHIPPED | OUTPATIENT
Start: 2024-01-16 | End: 2024-02-09

## 2024-01-16 RX ORDER — METHOCARBAMOL 750 MG/1
750 TABLET, FILM COATED ORAL DAILY
COMMUNITY

## 2024-01-16 RX ORDER — ATORVASTATIN CALCIUM 10 MG/1
10 TABLET, FILM COATED ORAL DAILY
Qty: 90 TABLET | Refills: 3 | Status: SHIPPED | OUTPATIENT
Start: 2024-01-16 | End: 2024-04-19 | Stop reason: SDUPTHER

## 2024-01-16 NOTE — PROGRESS NOTES
Subjective   Patient ID: Miguel May is a 68 y.o. male who presents for GERD, Back Pain, and Follow-up (Results / bloodwork / CT calcium).    HPI     Review of Systems    Objective   /82   Temp 36.9 °C (98.4 °F)   Wt 86.5 kg (190 lb 12.8 oz)   BMI 29.66 kg/m²     Physical Exam      Lab Results   Component Value Date    WBC 5.2 12/22/2023    HGB 12.0 (L) 12/22/2023    HCT 39.1 (L) 12/22/2023     12/22/2023    CHOL 167 09/22/2023    TRIG 58 09/22/2023    HDL 62.2 09/22/2023    ALT 13 09/22/2023    AST 18 08/05/2019     09/22/2023    K 4.5 09/22/2023     (H) 09/22/2023    CREATININE 1.14 09/22/2023    BUN 14 09/22/2023    CO2 27 09/22/2023    TSH 1.31 10/27/2023    PSA 1.35 09/22/2023    HGBA1C 5.8 (A) 09/22/2023     === 12/03/23 ===    CT CARDIAC SCORING WO IV CONTRAST  - Impression -  1. Coronary artery calcium score of 60.4*. This places the patient in  the lowest risk category.        Assessment/Plan   Problem List Items Addressed This Visit             ICD-10-CM    Benign hypertension I10    Relevant Medications    amLODIPine (Norvasc) 5 mg tablet    Hyperlipidemia E78.5    Relevant Medications    atorvastatin (Lipitor) 10 mg tablet     Other Visit Diagnoses         Codes    Anemia, unspecified type    -  Primary D64.9    Relevant Orders    Iron and TIBC    Vitamin B12    CBC    Folate    Ferritin    Referral to Gastroenterology    Hypothyroidism, unspecified type     E03.9    Relevant Orders    TSH with reflex to Free T4 if abnormal    Gastroesophageal reflux disease, unspecified whether esophagitis present     K21.9    Relevant Medications    pantoprazole (ProtoNix) 40 mg EC tablet    Other Relevant Orders    Referral to Gastroenterology            #1 hypertension- stable  #2 history of SVT-status post ablation 2013. No issue since. No recent palpitations. f/u cards  #3 hypothyroidism-on treatment for several years. Quite stable. Energy good. No heat/cold intolerance. No skin  changes. Bowel stable. labs  #4 hyperlipidemia- will change to lipitor 10 given strong fhx  #5 asthma-using Symbicort once a day only with good control. Primarily exercise induced only. Very rare Ventolin use otherwise. No nighttime awakenings or shortness of breath wanted. f/u pulm  #6 AM urinary freq- stable  #7 increased PSA- normalized. Follow-up with urology PRN  #8 ifbs-spent significant time reviewing.  Reviewed low sugar/carbohydrate diet daily exercise weight loss.  Consideration of metformin, declines now.  Recheck 4 months.  #9 mild anemia-likely secondary to blood donations.  However, not improving w/ 2 mths off.   Repeat 4 weeks with no blood draws prior, further eval INB.   #10 CACS -  >1<100.  Reviewed.  Continue to focus on lifestyle.  Continue statin.  Continue to target LDL less than 100.  #11 GERD- x  ~1 mth w/ increased issues.  Consult GI.  Transition to PPI.  #12 LBP- continue PT.   Appt pending w/ pain med  #13 anemia-mild but persistent.  Did not resolve with discontinuing blood donations.  Retest with iron panel.  Consult GI.     +fhx CAD (father/brother)  Reviewed Shingrix   colonoscopy 2016--> due 2026  reviewed risk of NSAIDS  COVID booster ([facility]) ASAP

## 2024-01-17 ENCOUNTER — TREATMENT (OUTPATIENT)
Dept: PHYSICAL THERAPY | Facility: CLINIC | Age: 69
End: 2024-01-17
Payer: MEDICARE

## 2024-01-17 DIAGNOSIS — M54.40 ACUTE BILATERAL LOW BACK PAIN WITH SCIATICA: ICD-10-CM

## 2024-01-17 PROCEDURE — 97012 MECHANICAL TRACTION THERAPY: CPT | Mod: GP | Performed by: PHYSICAL THERAPIST

## 2024-01-17 PROCEDURE — 97140 MANUAL THERAPY 1/> REGIONS: CPT | Mod: GP | Performed by: PHYSICAL THERAPIST

## 2024-01-17 PROCEDURE — 97014 ELECTRIC STIMULATION THERAPY: CPT | Mod: GP | Performed by: PHYSICAL THERAPIST

## 2024-01-17 NOTE — PROGRESS NOTES
"Physical Therapy                                                                                 Physical Therapy Treatment    Patient Name: Miguel May  MRN: [id number]  Today's Date: 1/17/2024    Referred by: Dr. Arroyo  Diagnosis: acute LBP    Visit: 13  Time in 945  Time out 1050  Total time  65  mins    SUBJECTIVE:  Pt reports that he felt pretty good after traction for a few days. Reports that he had increased back pain after doing his exercises yesterday.    Has Hx of recurrent LBP.  XR- 2019- moderate disc space narrowing L5-S1 and mild L4-5  Facet joint hypertrophy at same levels    Pain: 3/10 lumbar        OBJECTIVE:   Has difficulty transitioning from sit to stand due to back pain.    Lumbar ROM:  Trunk flexion: 45 degrees  Trunk extension: 11 degrees     LE strength: 5/5 bilat LE's    SLR:  Right: 57 degrees  Left: 75 degrees    Special Testing: Slump test + left leg    OUTCOME MEASURE:  MODIFIED VIKTOR 14%      ASSESSMENT:  Intermittent back pain persists. Increased traction weight to 60#.  Will monitor response.    TREATMENT:  EXERCISES 12/20/23  1/3/24 1/10/24 1/17/24                 Child's pose       Prone prop       Prone press up       Walker hip flexor stretch       Sitting hamstring stretch       Lumbar traction-supine   50#/25#  15 mins. 30\"H,10\"R 60#/30# 15 mins 30\"H, 10\" rest          HP and ES lumbar 15 mins 15 mins 15 mins 15 mins          Graston  15 mins 15 mins 15 mins 15 mins          Back extension machine 70#  3 X 15 70#  3 X 15     Mid rows  BLK 30X BLK 30X     Pull downs BLK 30X BLK 30X     Sidestep with band BLK 10X BLK 10X     Trunk flexion with ball 10X      Abdominal curl 50#  3 X 10 50#  3 X 10     Diagonal pull down-hoist 20#  20X 20#  20X     Diagonal pull up-hoist 15#   20X 15#  20X                                                                           HEP                    PATIENT EDUCATION: HEP    PLAN:    Maintenance of lordosis with sitting. Progress core " strengthening.  Rehab potential: fair to good  Plan of care agreement:Y    GOALS:  Active       PT Problem       Pt. will have decreased reports of pain by at least 2 levels using a VAS (Progressing)       Start:  11/01/23    Expected End:  12/29/23            Pt. will have improved score on Oswestry by at least 15%  (Progressing)       Start:  11/01/23    Expected End:  12/29/23            Pt. will have improved pain free AROM to assist with improving functional tolerances (Progressing)       Start:  11/01/23    Expected End:  12/29/23            Pt. will have improved LE and core strength to assist with improving functional tolerances (Progressing)       Start:  11/01/23    Expected End:  12/29/23            Pt. will be able to stand and walk for longer periods        Start:  11/01/23    Expected End:  12/29/23                     Clear bilaterally, pupils equal, round and reactive to light.

## 2024-01-20 DIAGNOSIS — I10 BENIGN HYPERTENSION: ICD-10-CM

## 2024-01-22 RX ORDER — AMLODIPINE BESYLATE 5 MG/1
5 TABLET ORAL DAILY
Qty: 100 TABLET | Refills: 2 | Status: SHIPPED | OUTPATIENT
Start: 2024-01-22

## 2024-01-24 ENCOUNTER — OFFICE VISIT (OUTPATIENT)
Dept: PAIN MEDICINE | Facility: HOSPITAL | Age: 69
End: 2024-01-24
Payer: MEDICARE

## 2024-01-24 ENCOUNTER — HOSPITAL ENCOUNTER (OUTPATIENT)
Dept: RADIOLOGY | Facility: HOSPITAL | Age: 69
Discharge: HOME | End: 2024-01-24
Payer: MEDICARE

## 2024-01-24 ENCOUNTER — APPOINTMENT (OUTPATIENT)
Dept: PHYSICAL THERAPY | Facility: CLINIC | Age: 69
End: 2024-01-24
Payer: MEDICARE

## 2024-01-24 DIAGNOSIS — M54.40 ACUTE BILATERAL LOW BACK PAIN WITH SCIATICA, SCIATICA LATERALITY UNSPECIFIED: ICD-10-CM

## 2024-01-24 DIAGNOSIS — M54.50 LOW BACK PAIN, UNSPECIFIED BACK PAIN LATERALITY, UNSPECIFIED CHRONICITY, UNSPECIFIED WHETHER SCIATICA PRESENT: ICD-10-CM

## 2024-01-24 DIAGNOSIS — M54.40 ACUTE BILATERAL LOW BACK PAIN WITH SCIATICA, SCIATICA LATERALITY UNSPECIFIED: Primary | ICD-10-CM

## 2024-01-24 PROCEDURE — 1036F TOBACCO NON-USER: CPT | Performed by: ANESTHESIOLOGY

## 2024-01-24 PROCEDURE — 99204 OFFICE O/P NEW MOD 45 MIN: CPT | Performed by: ANESTHESIOLOGY

## 2024-01-24 PROCEDURE — 1125F AMNT PAIN NOTED PAIN PRSNT: CPT | Performed by: ANESTHESIOLOGY

## 2024-01-24 PROCEDURE — 72114 X-RAY EXAM L-S SPINE BENDING: CPT | Performed by: RADIOLOGY

## 2024-01-24 PROCEDURE — 1159F MED LIST DOCD IN RCRD: CPT | Performed by: ANESTHESIOLOGY

## 2024-01-24 PROCEDURE — 99214 OFFICE O/P EST MOD 30 MIN: CPT | Performed by: ANESTHESIOLOGY

## 2024-01-24 PROCEDURE — 72120 X-RAY BEND ONLY L-S SPINE: CPT

## 2024-01-24 PROCEDURE — 1160F RVW MEDS BY RX/DR IN RCRD: CPT | Performed by: ANESTHESIOLOGY

## 2024-01-24 ASSESSMENT — PAIN SCALES - GENERAL: PAINLEVEL_OUTOF10: 6

## 2024-01-24 ASSESSMENT — PAIN - FUNCTIONAL ASSESSMENT: PAIN_FUNCTIONAL_ASSESSMENT: 0-10

## 2024-01-24 NOTE — PROGRESS NOTES
History Of Present Illness  Miguel May is a 68 y.o. male with a past medical history of hypertension, hyperlipidemia, asthma, history of SVT, GERD presents as referral for management of low back pain.  Patient states he was golfing in October and since has had continued axial back pain.  He says it is difficult to characterize the nature of the pain as it, waxes and wanes.  He says it is usually worse when sitting but can happen at other portions of the day.  He describes it as a aching low back pain.  He occasionally does have tightness in his hamstrings but does not necessarily correlate it with prolonged walking.  Plain films from 2019 showing mild narrowing of L4-5 disc interspace with mild grade 1 spondylolisthesis at that level.  He has not had any neuromodulating medications.  Patient is very active and has done formal physical therapy and is very active with playing sports and cycling. The pain causes significant stress in the patient's life, specifically interferes with general activity, mood, walking ability, ability to perform tasks at home and/or work.  Patient participates in physical therapy and continues to perform physician directed exercises at home. Denies any bowel or bladder incontinence, saddle anesthesia, worsening pain, weakness or falls.     Past Medical History  He has a past medical history of Pain in right hand (08/25/2020).    Surgical History  He has a past surgical history that includes Other surgical history (02/10/2019); Other surgical history (02/10/2019); Other surgical history (02/10/2019); and Other surgical history (02/10/2019).     Social History  He reports that he has never smoked. He has never used smokeless tobacco. He reports that he does not currently use alcohol. He reports that he does not use drugs.    Family History  Family History   Problem Relation Name Age of Onset    Other (malignant neoplasm) Mother      Other (cardia disorder) Father      Coronary artery  disease Brother  50 - 59        -tob        Allergies  Patient has no known allergies.    Review of Symptoms:   Constitutional: Negative for chills, diaphoresis or fever  HENT: Negative for neck swelling  Eyes:.  Negative for eye pain  Respiratory:.  Negative for cough, shortness of breath or wheezing    Cardiovascular:.  Negative for chest pain or palpitations  Gastrointestinal:.  Negative for abdominal pain, nausea and vomiting  Genitourinary:.  Negative for urgency  Musculoskeletal:  Positive for back pain. Positive for joint pain. Denies falls within the past 3 months.  Skin: Negative for wounds or itching   Neurological: Negative for dizziness, seizures, loss of consciousness and weakness  Endo/Heme/Allergies: Does not bruise/bleed easily  Psychiatric/Behavioral: Negative for depression. The patient does not appear anxious.       PHYSICAL EXAM  Vitals signs reviewed  Constitutional:       General: Not in acute distress     Appearance: Normal appearance. Not ill-appearing.  HENT:     Head: Normocephalic and atraumatic  Eyes:     Conjunctiva/sclera: Conjunctivae normal  Cardiovascular:     Rate and Rhythm: Normal rate and regular rhythm  Pulmonary:     Effort: No respiratory distress  Abdominal:     Palpations: Abdomen is soft  Musculoskeletal: ALVARADO  Skin:     General: Skin is warm and dry  Neurological:     General: No focal deficit present  Psychiatric:         Mood and Affect: Mood normal         Behavior: Behavior normal    Advanced Exam   Inspection: No gross deformities, no surgical scars  Palpation: No tenderness of patient of lumbar midline, lumbar paraspinals, bilateral SI joints  ROM: Normal range of motion of the lumbar flexion extension  Motor: 5-5 strength upper and lower extremities  Sensory: Negative for sensory abnormalities in upper and lower extremities  Reflexes: 2+ reflexes bilateral upper and lower extremities  Lumbar: Negative straight leg raising bilaterally, negative for facet  loading  Sacral: Negative Suzie, negative Gaenslen's  Hip: Negative for pain with anterior, lateral, posterior palpation of hip joints, negative FADIR, negative for internal/external rotation of the hip, negative logroll     Last Recorded Vitals  There were no vitals taken for this visit.    Relevant Results  Current Outpatient Medications   Medication Instructions    amLODIPine (NORVASC) 5 mg, oral, Daily    atorvastatin (LIPITOR) 10 mg, oral, Daily    Bacillus coagulans (PROBIOTIC, B. COAGULANS, ORAL) oral    famotidine (PEPCID) 20 mg, oral, Daily    glucos sul 2KCl-msm-chond-C-Mn (Glucosamine Chondroitin) 550-30-1 mg capsule oral    levothyroxine (Synthroid, Levoxyl) 112 mcg tablet TAKE 1 TABLET BY MOUTH DAILY    losartan (Cozaar) 50 mg tablet TAKE 1 TABLET BY MOUTH  DAILY AS DIRECTED    methocarbamol (ROBAXIN) 750 mg, oral, Daily, PRN    montelukast (Singulair) 10 mg tablet oral    multivitamin tablet oral    pantoprazole (PROTONIX) 40 mg, oral, Daily, Do not crush, chew, or split.    simvastatin (ZOCOR) 5 mg, oral, Nightly    Symbicort 80-4.5 mcg/actuation inhaler 2 puffs, inhalation, Daily    tumeric-ging-olive-oreg-capryl 100 mg-150 mg- 50 mg-150 mg capsule oral       No results found for this or any previous visit from the past 1000 days.     No image results found.       1. Acute bilateral low back pain with sciatica, sciatica laterality unspecified  XR lumbar spine 4+ views w flexion extension    MR lumbar spine wo IV contrast           ASSESSMENT/PLAN  Miguel May is a 68 y.o. male with a past medical history of hypertension, hyperlipidemia, asthma, history of SVT, GERD presents as referral for management of low back pain.  Patient has had continued axial low back pain for months worse with sitting and standing.  Patients likely has multifactorial components of his pain including components of spinal stenosis as well facetogenic pain.  Will start by getting flexion-extension films as there was mild  spondylolisthesis from x-ray from 2019, and we will get MRI.  Pending results can consider possible intervention at that time.    Our plan is as follows:  - Flexion-extension plain films.  - Will get MRI of lower back, and can consider possible intervention given results.  - Continue to participate in physical therapy as well as physician directed home exercises    Luis Alfredo Womack MD

## 2024-01-26 ENCOUNTER — TREATMENT (OUTPATIENT)
Dept: PHYSICAL THERAPY | Facility: CLINIC | Age: 69
End: 2024-01-26
Payer: MEDICARE

## 2024-01-26 DIAGNOSIS — M54.40 ACUTE BILATERAL LOW BACK PAIN WITH SCIATICA: Primary | ICD-10-CM

## 2024-01-26 PROCEDURE — 97012 MECHANICAL TRACTION THERAPY: CPT | Mod: GP | Performed by: PHYSICAL THERAPIST

## 2024-01-26 PROCEDURE — 97110 THERAPEUTIC EXERCISES: CPT | Mod: GP | Performed by: PHYSICAL THERAPIST

## 2024-01-26 PROCEDURE — 97014 ELECTRIC STIMULATION THERAPY: CPT | Mod: GP | Performed by: PHYSICAL THERAPIST

## 2024-01-26 NOTE — PROGRESS NOTES
"Physical Therapy                                                                                 Physical Therapy Treatment    Patient Name: Miguel May  MRN: [id number]  Today's Date: 1/26/2024    Referred by: Dr. Arroyo  Diagnosis: acute LBP    Visit: 13  Time in 950  Time out 1115  Total time  85 mins    SUBJECTIVE:  Pt reports that he saw pain mangament doctor yesterday who ordered xrays which showed degenerative changes and stable anterior subluxation L4 on L5. Scheduled for MRI on 2/2/24. Reports he had some relief after traction last visit.    Has Hx of recurrent LBP.  XR- 2019- moderate disc space narrowing L5-S1 and mild L4-5  Facet joint hypertrophy at same levels    Pain: 2/10 lumbar        OBJECTIVE:       Lumbar ROM:  Trunk flexion: 45 degrees  Trunk extension: 11 degrees     LE strength: 5/5 bilat LE's    SLR:  Right: 57 degrees  Left: 75 degrees    Special Testing: Slump test + left leg    OUTCOME MEASURE:  MODIFIED VIKTOR 14%      ASSESSMENT:  Pt is receiving short term relief with traction. Scheduled for MRI 2/1/24.    TREATMENT:  EXERCISES 1/26/24                    Child's pose       Prone prop       Prone press up       Walker hip flexor stretch       Sitting hamstring stretch       Lumbar traction-supine 65#/20#  15 mins 30\"H,10\"R             HP and ES lumbar 15 mins             [email or web address]               Back extension machine 80#  3 X 10      Mid rows  BLK  30X      Pull downs BLK  30X      Sidestep with band BLK  30X      Trunk flexion with ball 10X ea      Abdominal curl 60#  3 X 10      Diagonal pull down-hoist 20#  20X      Diagonal pull up-hoist 20#   20X                                                                            HEP                    PATIENT EDUCATION: HEP    PLAN:    Maintenance of lordosis with sitting. Progress core strengthening.  Rehab potential: fair to good  Plan of care agreement:Y    GOALS:  Active       PT Problem       Pt. will have decreased reports of pain by at " least 2 levels using a VAS (Progressing)       Start:  11/01/23    Expected End:  12/29/23            Pt. will have improved score on Oswestry by at least 15%  (Progressing)       Start:  11/01/23    Expected End:  12/29/23            Pt. will have improved pain free AROM to assist with improving functional tolerances (Progressing)       Start:  11/01/23    Expected End:  12/29/23            Pt. will have improved LE and core strength to assist with improving functional tolerances (Progressing)       Start:  11/01/23    Expected End:  12/29/23            Pt. will be able to stand and walk for longer periods        Start:  11/01/23    Expected End:  12/29/23

## 2024-01-31 ENCOUNTER — TREATMENT (OUTPATIENT)
Dept: PHYSICAL THERAPY | Facility: CLINIC | Age: 69
End: 2024-01-31
Payer: MEDICARE

## 2024-01-31 ENCOUNTER — LAB (OUTPATIENT)
Dept: LAB | Facility: LAB | Age: 69
End: 2024-01-31
Payer: MEDICARE

## 2024-01-31 DIAGNOSIS — M54.40 ACUTE BILATERAL LOW BACK PAIN WITH SCIATICA: ICD-10-CM

## 2024-01-31 DIAGNOSIS — D64.9 ANEMIA, UNSPECIFIED TYPE: ICD-10-CM

## 2024-01-31 DIAGNOSIS — E03.9 HYPOTHYROIDISM, UNSPECIFIED TYPE: ICD-10-CM

## 2024-01-31 DIAGNOSIS — J45.909 UNSPECIFIED ASTHMA, UNCOMPLICATED (HHS-HCC): ICD-10-CM

## 2024-01-31 LAB
ERYTHROCYTE [DISTWIDTH] IN BLOOD BY AUTOMATED COUNT: 15.7 % (ref 11.5–14.5)
FERRITIN SERPL-MCNC: 19 NG/ML (ref 20–300)
FOLATE SERPL-MCNC: >22.3 NG/ML
HCT VFR BLD AUTO: 42.9 % (ref 41–52)
HGB BLD-MCNC: 12.9 G/DL (ref 13.5–17.5)
IRON SATN MFR SERPL: 9 % (ref 25–45)
IRON SERPL-MCNC: 39 UG/DL (ref 35–150)
MCH RBC QN AUTO: 23.5 PG (ref 26–34)
MCHC RBC AUTO-ENTMCNC: 30.1 G/DL (ref 32–36)
MCV RBC AUTO: 78 FL (ref 80–100)
NRBC BLD-RTO: 0 /100 WBCS (ref 0–0)
PLATELET # BLD AUTO: 219 X10*3/UL (ref 150–450)
RBC # BLD AUTO: 5.5 X10*6/UL (ref 4.5–5.9)
TIBC SERPL-MCNC: 434 UG/DL (ref 240–445)
TSH SERPL-ACNC: 2.28 MIU/L (ref 0.44–3.98)
UIBC SERPL-MCNC: 395 UG/DL (ref 110–370)
VIT B12 SERPL-MCNC: 343 PG/ML (ref 211–911)
WBC # BLD AUTO: 5.9 X10*3/UL (ref 4.4–11.3)

## 2024-01-31 PROCEDURE — 97012 MECHANICAL TRACTION THERAPY: CPT | Mod: GP | Performed by: PHYSICAL THERAPIST

## 2024-01-31 PROCEDURE — 97140 MANUAL THERAPY 1/> REGIONS: CPT | Mod: GP | Performed by: PHYSICAL THERAPIST

## 2024-01-31 PROCEDURE — 83540 ASSAY OF IRON: CPT

## 2024-01-31 PROCEDURE — 36415 COLL VENOUS BLD VENIPUNCTURE: CPT

## 2024-01-31 PROCEDURE — 82746 ASSAY OF FOLIC ACID SERUM: CPT

## 2024-01-31 PROCEDURE — 82728 ASSAY OF FERRITIN: CPT

## 2024-01-31 PROCEDURE — 85027 COMPLETE CBC AUTOMATED: CPT

## 2024-01-31 PROCEDURE — 83550 IRON BINDING TEST: CPT

## 2024-01-31 PROCEDURE — 97014 ELECTRIC STIMULATION THERAPY: CPT | Mod: GP | Performed by: PHYSICAL THERAPIST

## 2024-01-31 PROCEDURE — 84443 ASSAY THYROID STIM HORMONE: CPT

## 2024-01-31 PROCEDURE — 82607 VITAMIN B-12: CPT

## 2024-01-31 PROCEDURE — 97032 APPL MODALITY 1+ESTIM EA 15: CPT | Mod: GP | Performed by: PHYSICAL THERAPIST

## 2024-01-31 NOTE — PROGRESS NOTES
"Physical Therapy                                                                                 Physical Therapy Treatment/Reassessment/Discharge    Patient Name: Miguel May  MRN: [id number]  Today's Date: 1/31/2024    Referred by: Dr. Arroyo  Diagnosis: acute LBP    Visit: 15  Time in 950  Time out 1110  Total time   80 mins    SUBJECTIVE:  Pt reports that he played pickleball yesterday and had some LBP afterward.    Has Hx of recurrent LBP.  XR- 2019- moderate disc space narrowing L5-S1 and mild L4-5  Facet joint hypertrophy at same levels    Pain: 2/10 lumbar        OBJECTIVE:       Lumbar ROM:  Trunk flexion: 45 degrees  Trunk extension: 11 degrees     LE strength: 5/5 bilat LE's    SLR:  Right: 57 degrees  Left: 75 degrees    Special Testing: Slump test + left leg    OUTCOME MEASURE:  MODIFIED VIKTOR 14%      ASSESSMENT:  Pt is receiving short term relief with traction. Scheduled for MRI 2/1/24.    TREATMENT:  EXERCISES 1/26/24 1/31/24                   Child's pose       Prone prop       Prone press up       Walker hip flexor stretch       Sitting hamstring stretch       Lumbar traction-supine 65#/20#  15 mins 30\"H,10\"R 65#/10#  30\"H, 10\"R 15 mins            HP and ES lumbar 15 mins 15 mins            [place]   15 mins            Back extension machine 80#  3 X 10      Mid rows  BLK  30X      Pull downs BLK  30X      Sidestep with band BLK  30X      Trunk flexion with ball 10X ea      Abdominal curl 60#  3 X 10      Diagonal pull down-hoist 20#  20X      Diagonal pull up-hoist 20#   20X                                                                            HEP                    PATIENT EDUCATION: HEP    PLAN:    Pt is leaving for [place] and will be gone for two months. Will discharge patient from PT as he will be out of state. Pt will continue HEP.    GOALS:  Active       PT Problem       Pt. will have decreased reports of pain by at least 2 levels using a VAS (Progressing)       Start:  11/01/23    " Expected End:  12/29/23            Pt. will have improved score on Oswestry by at least 15%  (Progressing)       Start:  11/01/23    Expected End:  12/29/23            Pt. will have improved pain free AROM to assist with improving functional tolerances (Progressing)       Start:  11/01/23    Expected End:  12/29/23            Pt. will have improved LE and core strength to assist with improving functional tolerances (Progressing)       Start:  11/01/23    Expected End:  12/29/23            Pt. will be able to stand and walk for longer periods        Start:  11/01/23    Expected End:  12/29/23

## 2024-02-01 RX ORDER — BUDESONIDE AND FORMOTEROL FUMARATE DIHYDRATE 80; 4.5 UG/1; UG/1
2 AEROSOL RESPIRATORY (INHALATION) 2 TIMES DAILY
Qty: 10.2 G | Refills: 3 | Status: SHIPPED | OUTPATIENT
Start: 2024-02-01

## 2024-02-02 ENCOUNTER — HOSPITAL ENCOUNTER (OUTPATIENT)
Dept: RADIOLOGY | Facility: CLINIC | Age: 69
Discharge: HOME | End: 2024-02-02
Payer: MEDICARE

## 2024-02-02 DIAGNOSIS — M54.40 ACUTE BILATERAL LOW BACK PAIN WITH SCIATICA, SCIATICA LATERALITY UNSPECIFIED: ICD-10-CM

## 2024-02-02 PROCEDURE — 72148 MRI LUMBAR SPINE W/O DYE: CPT | Performed by: RADIOLOGY

## 2024-02-02 PROCEDURE — 72148 MRI LUMBAR SPINE W/O DYE: CPT

## 2024-02-08 DIAGNOSIS — M54.40 ACUTE BILATERAL LOW BACK PAIN WITH SCIATICA, SCIATICA LATERALITY UNSPECIFIED: Primary | ICD-10-CM

## 2024-02-09 ENCOUNTER — TELEPHONE (OUTPATIENT)
Dept: PRIMARY CARE | Facility: CLINIC | Age: 69
End: 2024-02-09
Payer: MEDICARE

## 2024-02-09 DIAGNOSIS — K21.9 GASTROESOPHAGEAL REFLUX DISEASE, UNSPECIFIED WHETHER ESOPHAGITIS PRESENT: ICD-10-CM

## 2024-02-09 RX ORDER — PANTOPRAZOLE SODIUM 40 MG/1
40 TABLET, DELAYED RELEASE ORAL DAILY
Qty: 90 TABLET | Refills: 1 | Status: SHIPPED | OUTPATIENT
Start: 2024-02-09 | End: 2024-05-28 | Stop reason: SDUPTHER

## 2024-02-09 NOTE — TELEPHONE ENCOUNTER
I have a call into [facility] to see if we can get the pt an appt late March, as he will be back from [place] then.

## 2024-02-09 NOTE — TELEPHONE ENCOUNTER
----- Message from Mary Ellen Arroyo MD sent at 2/8/2024 12:24 PM EST -----  Regarding: FW: Iron Deficiency blood work results  Contact: [phone]  Can we get him into see GI any sooner?  ----- Message -----  From: Gricelda Ann CMA  Sent: 2/8/2024   9:57 AM EST  To: Mary Ellen Arroyo MD  Subject: FW: Iron Deficiency blood work results             ----- Message -----  From: Miguel May  Sent: 2/8/2024   8:58 AM EST  To:  [id number] [id number] Clinical Support Staff  Subject: Iron Deficiency blood work results               MRI Results are in.  I’m not sure how to read my MRI results but this is concerning to me.    “ Incompletely evaluated subcentimeter T2 hyperintense lesion within the left kidney likely represents a cyst.”    Do I need to be concerned with cyst?    I saw your response to my blood work. The soonest appointment I was able to secure with Dr Jairon Julian was May 28th @ 2:20P. Do you think I need seen sooner? If so, can you facilitate an earlier appointment after we return from [place] in mid-March. I don’t feel off in any way but not sure what an iron deficiency anemia is.    Felipe Tariq

## 2024-02-14 NOTE — TELEPHONE ENCOUNTER
I spoke with the pt and he said he had not heard from  Gastro as of today, 2/14/24.  I called and left a msg with [facility] and asked them to call me back and let em know that they received the referral, and if it's possible to get the pt an appt in the end of March.

## 2024-02-15 NOTE — TELEPHONE ENCOUNTER
[facility] called back and said that they never received the referral and supporting paperwork. I faxed that paperwork to them.

## 2024-03-19 ENCOUNTER — HOSPITAL ENCOUNTER (OUTPATIENT)
Dept: RADIOLOGY | Facility: HOSPITAL | Age: 69
Discharge: HOME | End: 2024-03-19
Payer: MEDICARE

## 2024-03-19 VITALS
BODY MASS INDEX: 28.49 KG/M2 | WEIGHT: 188 LBS | DIASTOLIC BLOOD PRESSURE: 71 MMHG | HEART RATE: 66 BPM | RESPIRATION RATE: 16 BRPM | SYSTOLIC BLOOD PRESSURE: 144 MMHG | HEIGHT: 68 IN | TEMPERATURE: 97.8 F | OXYGEN SATURATION: 97 %

## 2024-03-19 DIAGNOSIS — M54.40 ACUTE BILATERAL LOW BACK PAIN WITH SCIATICA, SCIATICA LATERALITY UNSPECIFIED: ICD-10-CM

## 2024-03-19 PROCEDURE — 64494 INJ PARAVERT F JNT L/S 2 LEV: CPT | Mod: 50 | Performed by: ANESTHESIOLOGY

## 2024-03-19 PROCEDURE — 64493 INJ PARAVERT F JNT L/S 1 LEV: CPT | Performed by: ANESTHESIOLOGY

## 2024-03-19 PROCEDURE — 64494 INJ PARAVERT F JNT L/S 2 LEV: CPT | Performed by: ANESTHESIOLOGY

## 2024-03-19 PROCEDURE — 64493 INJ PARAVERT F JNT L/S 1 LEV: CPT | Mod: 50 | Performed by: ANESTHESIOLOGY

## 2024-03-19 ASSESSMENT — PAIN SCALES - GENERAL
PAINLEVEL_OUTOF10: 3
PAINLEVEL_OUTOF10: 8
PAINLEVEL_OUTOF10: 8
PAINLEVEL_OUTOF10: 0 - NO PAIN

## 2024-03-19 ASSESSMENT — PAIN - FUNCTIONAL ASSESSMENT
PAIN_FUNCTIONAL_ASSESSMENT: 0-10
PAIN_FUNCTIONAL_ASSESSMENT: 0-10

## 2024-03-19 NOTE — PROCEDURES
Pre-Op Diagnosis: Lumbosacral spondylosis   Post-Procedure Diagnosis: Same as preop diagnosis  Procedure: Bilateral diagnostic L3 and L4 medial branch nerve blocks and bilateral L5 posterior rami block using fluoroscopic guidance  Surgeon: Carol Hurtado MD, PhD   Resident/Fellow/Other Assistant: Jairon Diez MD     Procedure Note:     Mr. Miguel May is a 68 y.o. male with bilateral low back pain worse with golfing presents for diagnostic lumbar facet injections.  Following informed consent, the patient was brought to the operating room and placed in the prone position.  The low back area was prepped with chlorhexidine and draped in the usual sterile fashion.  Using fluoroscopic guidance, 25-gauge spinal needles were advanced under fluoroscopic guidance to the appropriate target sites on the right side and needle positions were confirmed in AP and lateral views.  Injection of the small amount of iohexol contrast at each needle tip did not result with vascular uptake.  As such, 0.5 cc of 2% lidocaine was applied at each needle tip and the needles were then removed.  An identical procedure was repeated on the left side. The patient was transferred to the recovery room in stable condition.   Recovery area, the patient reported that his preoperative pain score which was 5-6 on a scale of 0-10 decreased to 1.  As such, a repeat confirmatory diagnostic lumbar facet injection procedure will be repeated as a prelude to radiofrequency ablation.

## 2024-03-19 NOTE — H&P
History Of Present Illness  Miguel May is a 68 y.o. male presenting with bilateral low axial back pain.     Past Medical History  Past Medical History:   Diagnosis Date    Pain in right hand 08/25/2020    Pain of right hand       Surgical History  Past Surgical History:   Procedure Laterality Date    OTHER SURGICAL HISTORY  02/10/2019    Wrist surgery    OTHER SURGICAL HISTORY  02/10/2019    Catheter ablation    OTHER SURGICAL HISTORY  02/10/2019    Temporomandibular joint arthroscopy    OTHER SURGICAL HISTORY  02/10/2019    Tonsillectomy        Social History  He reports that he has never smoked. He has never used smokeless tobacco. He reports that he does not currently use alcohol. He reports that he does not use drugs.    Family History  Family History   Problem Relation Name Age of Onset    Other (malignant neoplasm) Mother      Other (cardia disorder) Father      Coronary artery disease Brother  50 - 59        -tob        Allergies  Patient has no known allergies.    Review of Systems   13 point ROS done and negative except for the above.   Physical Exam     General: NAD, well nourished   Eyes: Non-icteric sclera, EOMI  Ears, Nose, Mouth, and Throat: External ears and nose appear to be without deformity or rash. No lesions or masses noted. Hearing is grossly intact.   Neck: Trachea midline  Respiratory: Nonlabored breathing   Cardiovascular: No JVD  Skin: No rashes or open lesions/ulcers identified on skin.    Last Recorded Vitals  There were no vitals taken for this visit.    Relevant Results           Assessment/Plan   Problem List Items Addressed This Visit             ICD-10-CM       Musculoskeletal and Injuries    Acute bilateral low back pain with sciatica M54.40    Relevant Orders    [place] pain management TC     Medial Nerve Branch Block       Plan for bilateral lumbar medial branch nerve blocks, his first.    Risks, benefits, alternatives to the procedure were discussed in detail and patient was  amenable to proceeding.    Jairon Diez MD

## 2024-04-01 DIAGNOSIS — I10 PRIMARY HYPERTENSION: ICD-10-CM

## 2024-04-01 DIAGNOSIS — E03.9 HYPOTHYROIDISM, UNSPECIFIED TYPE: ICD-10-CM

## 2024-04-01 RX ORDER — LOSARTAN POTASSIUM 50 MG/1
TABLET ORAL
Qty: 100 TABLET | Refills: 2 | Status: SHIPPED | OUTPATIENT
Start: 2024-04-01

## 2024-04-01 RX ORDER — LEVOTHYROXINE SODIUM 112 UG/1
TABLET ORAL
Qty: 100 TABLET | Refills: 2 | Status: SHIPPED | OUTPATIENT
Start: 2024-04-01

## 2024-04-04 ENCOUNTER — LAB (OUTPATIENT)
Dept: LAB | Facility: LAB | Age: 69
End: 2024-04-04
Payer: MEDICARE

## 2024-04-04 ENCOUNTER — OFFICE VISIT (OUTPATIENT)
Dept: GASTROENTEROLOGY | Facility: CLINIC | Age: 69
End: 2024-04-04
Payer: MEDICARE

## 2024-04-04 VITALS
HEART RATE: 62 BPM | RESPIRATION RATE: 20 BRPM | BODY MASS INDEX: 28.44 KG/M2 | WEIGHT: 192 LBS | DIASTOLIC BLOOD PRESSURE: 74 MMHG | HEIGHT: 69 IN | SYSTOLIC BLOOD PRESSURE: 118 MMHG | TEMPERATURE: 97.6 F

## 2024-04-04 DIAGNOSIS — M79.7 CHRONIC FATIGUE SYNDROME WITH FIBROMYALGIA: ICD-10-CM

## 2024-04-04 DIAGNOSIS — G93.32 CHRONIC FATIGUE SYNDROME WITH FIBROMYALGIA: ICD-10-CM

## 2024-04-04 DIAGNOSIS — K21.9 GASTROESOPHAGEAL REFLUX DISEASE WITHOUT ESOPHAGITIS: ICD-10-CM

## 2024-04-04 DIAGNOSIS — D50.9 IRON DEFICIENCY ANEMIA, UNSPECIFIED IRON DEFICIENCY ANEMIA TYPE: Primary | ICD-10-CM

## 2024-04-04 DIAGNOSIS — D50.9 IRON DEFICIENCY ANEMIA, UNSPECIFIED IRON DEFICIENCY ANEMIA TYPE: ICD-10-CM

## 2024-04-04 LAB
ERYTHROCYTE [DISTWIDTH] IN BLOOD BY AUTOMATED COUNT: 16.6 % (ref 11.5–14.5)
HCT VFR BLD AUTO: 41.8 % (ref 41–52)
HGB BLD-MCNC: 12.9 G/DL (ref 13.5–17.5)
IRON SATN MFR SERPL: 8 % (ref 25–45)
IRON SERPL-MCNC: 34 UG/DL (ref 35–150)
MCH RBC QN AUTO: 23.4 PG (ref 26–34)
MCHC RBC AUTO-ENTMCNC: 30.9 G/DL (ref 32–36)
MCV RBC AUTO: 76 FL (ref 80–100)
NRBC BLD-RTO: 0 /100 WBCS (ref 0–0)
PLATELET # BLD AUTO: 194 X10*3/UL (ref 150–450)
RBC # BLD AUTO: 5.51 X10*6/UL (ref 4.5–5.9)
TIBC SERPL-MCNC: 439 UG/DL (ref 240–445)
UIBC SERPL-MCNC: 405 UG/DL (ref 110–370)
WBC # BLD AUTO: 8.1 X10*3/UL (ref 4.4–11.3)

## 2024-04-04 PROCEDURE — 3078F DIAST BP <80 MM HG: CPT | Performed by: NURSE PRACTITIONER

## 2024-04-04 PROCEDURE — 83550 IRON BINDING TEST: CPT

## 2024-04-04 PROCEDURE — 3074F SYST BP LT 130 MM HG: CPT | Performed by: NURSE PRACTITIONER

## 2024-04-04 PROCEDURE — 99204 OFFICE O/P NEW MOD 45 MIN: CPT | Performed by: NURSE PRACTITIONER

## 2024-04-04 PROCEDURE — 36415 COLL VENOUS BLD VENIPUNCTURE: CPT

## 2024-04-04 PROCEDURE — 99214 OFFICE O/P EST MOD 30 MIN: CPT | Performed by: NURSE PRACTITIONER

## 2024-04-04 PROCEDURE — 1159F MED LIST DOCD IN RCRD: CPT | Performed by: NURSE PRACTITIONER

## 2024-04-04 PROCEDURE — 85027 COMPLETE CBC AUTOMATED: CPT

## 2024-04-04 PROCEDURE — 1125F AMNT PAIN NOTED PAIN PRSNT: CPT | Performed by: NURSE PRACTITIONER

## 2024-04-04 PROCEDURE — 83540 ASSAY OF IRON: CPT

## 2024-04-04 PROCEDURE — 1160F RVW MEDS BY RX/DR IN RCRD: CPT | Performed by: NURSE PRACTITIONER

## 2024-04-04 ASSESSMENT — ENCOUNTER SYMPTOMS
PSYCHIATRIC NEGATIVE: 1
NEUROLOGICAL NEGATIVE: 1
EYES NEGATIVE: 1
HEMATOLOGIC/LYMPHATIC NEGATIVE: 1
MUSCULOSKELETAL NEGATIVE: 1
GASTROINTESTINAL NEGATIVE: 1
RESPIRATORY NEGATIVE: 1
ALLERGIC/IMMUNOLOGIC NEGATIVE: 1
CONSTITUTIONAL NEGATIVE: 1
CARDIOVASCULAR NEGATIVE: 1
ENDOCRINE NEGATIVE: 1

## 2024-04-04 ASSESSMENT — PAIN SCALES - GENERAL: PAINLEVEL: 6

## 2024-04-04 NOTE — PROGRESS NOTES
Subjective   Patient ID: Miguel May is a 68 y.o. male who presents for No chief complaint on file..  HPI    68-year-old male for evaluation of acid reflux  Medical history includes hypertension, HPL, hypothyroidism, GERD   reviewed Labs 1/ 31/ 2024  Ferritin 19  Folate 22.3  H&H 12.9 and 42.9  MCV 78  MCHC 30.1  MCH 23.5  RDW 15.7%  Iron 39  TSH 2.28    Takes protonix and famotidine  Very active - plays golf and other activities  Moved here from [place] in 2019  Had colonoscopy in 2016- no polyps per patient  No iron supplements  BM twice daily, no blood in stool , occasionally darker stool when he drinks more red wine  Has had issues with GERD in the past   Was on famotidine and changed to protonix and improved reflux  Was having back pain last year and was using Advil at that time  Was also donating blood at that time as well  Last donation was 10/23      Review of Systems   Constitutional: Negative.    HENT: Negative.     Eyes: Negative.    Respiratory: Negative.     Cardiovascular: Negative.    Gastrointestinal: Negative.    Endocrine: Negative.    Genitourinary: Negative.    Musculoskeletal: Negative.    Skin: Negative.    Allergic/Immunologic: Negative.    Neurological: Negative.    Hematological: Negative.    Psychiatric/Behavioral: Negative.         Objective   Physical Exam  Constitutional:       Appearance: Normal appearance.   HENT:      Nose: Nose normal.      Mouth/Throat:      Mouth: Mucous membranes are moist.   Eyes:      Pupils: Pupils are equal, round, and reactive to light.   Cardiovascular:      Pulses: Normal pulses.      Heart sounds: Normal heart sounds.   Pulmonary:      Effort: Pulmonary effort is normal.      Breath sounds: Normal breath sounds.   Abdominal:      General: Bowel sounds are normal.      Palpations: Abdomen is soft.   Musculoskeletal:         General: Normal range of motion.      Cervical back: Normal range of motion.   Skin:     General: Skin is warm and dry.    Neurological:      Mental Status: He is alert.   Psychiatric:         Mood and Affect: Mood normal.         Assessment/Plan       Iron deficency - I would recommend getting a repeat labs as this may be attributed to the advil you were taking and the blood donations at the time. I would also recommend a stool study for H-pylori as well. If your blood work still shows anemia Then I would recommend an EGD and colonoscopy.    Reflux- please continue the protonix daily for now.     I will call you with the results and determine follow up       RYAN Steven-CNP 04/04/24 2:06 PM

## 2024-04-04 NOTE — PATIENT INSTRUCTIONS
Iron deficency - I would recommend getting a repeat labs as this may be attributed to the advil you were taking and the blood donations at the time. I would also recommend a stool study for H-pylori as well. If your blood work still shows anemia Then I would recommend an EGD and colonoscopy.    Reflux- please continue the protonix daily for now.     I will call you with the results and determine follow up

## 2024-04-05 ENCOUNTER — HOSPITAL ENCOUNTER (OUTPATIENT)
Dept: RADIOLOGY | Facility: HOSPITAL | Age: 69
Discharge: HOME | End: 2024-04-05
Payer: MEDICARE

## 2024-04-05 VITALS
BODY MASS INDEX: 27.85 KG/M2 | HEART RATE: 60 BPM | HEIGHT: 69 IN | RESPIRATION RATE: 16 BRPM | DIASTOLIC BLOOD PRESSURE: 83 MMHG | TEMPERATURE: 97.3 F | SYSTOLIC BLOOD PRESSURE: 158 MMHG | WEIGHT: 188 LBS | OXYGEN SATURATION: 100 %

## 2024-04-05 DIAGNOSIS — M54.40 ACUTE BILATERAL LOW BACK PAIN WITH SCIATICA, SCIATICA LATERALITY UNSPECIFIED: ICD-10-CM

## 2024-04-05 DIAGNOSIS — M54.40 ACUTE RIGHT-SIDED LOW BACK PAIN WITH SCIATICA, SCIATICA LATERALITY UNSPECIFIED: ICD-10-CM

## 2024-04-05 DIAGNOSIS — D50.9 IRON DEFICIENCY ANEMIA, UNSPECIFIED IRON DEFICIENCY ANEMIA TYPE: Primary | ICD-10-CM

## 2024-04-05 PROCEDURE — 64494 INJ PARAVERT F JNT L/S 2 LEV: CPT | Mod: 50 | Performed by: ANESTHESIOLOGY

## 2024-04-05 PROCEDURE — 64494 INJ PARAVERT F JNT L/S 2 LEV: CPT | Performed by: ANESTHESIOLOGY

## 2024-04-05 PROCEDURE — 64493 INJ PARAVERT F JNT L/S 1 LEV: CPT | Mod: 50 | Performed by: ANESTHESIOLOGY

## 2024-04-05 PROCEDURE — 64493 INJ PARAVERT F JNT L/S 1 LEV: CPT | Performed by: ANESTHESIOLOGY

## 2024-04-05 RX ORDER — POLYETHYLENE GLYCOL 3350, SODIUM SULFATE ANHYDROUS, SODIUM BICARBONATE, SODIUM CHLORIDE, POTASSIUM CHLORIDE 236; 22.74; 6.74; 5.86; 2.97 G/4L; G/4L; G/4L; G/4L; G/4L
4000 POWDER, FOR SOLUTION ORAL ONCE
Qty: 4000 ML | Refills: 0 | Status: SHIPPED | OUTPATIENT
Start: 2024-04-05 | End: 2024-04-05

## 2024-04-05 ASSESSMENT — PAIN SCALES - GENERAL
PAINLEVEL_OUTOF10: 4
PAINLEVEL_OUTOF10: 4
PAINLEVEL_OUTOF10: 5 - MODERATE PAIN
PAINLEVEL_OUTOF10: 4
PAINLEVEL_OUTOF10: 1
PAINLEVEL_OUTOF10: 4

## 2024-04-05 ASSESSMENT — PAIN - FUNCTIONAL ASSESSMENT
PAIN_FUNCTIONAL_ASSESSMENT: 0-10
PAIN_FUNCTIONAL_ASSESSMENT: 0-10

## 2024-04-05 NOTE — H&P
Pain Management H&P    History Of Present Illness  Miguel May is a 68 y.o. male presents for procedure state below. Endorses no changes in past medical history or medical health since last seen in clinic.      Past Medical History  He has a past medical history of Pain in right hand (08/25/2020).    Surgical History  He has a past surgical history that includes Other surgical history (02/10/2019); Other surgical history (02/10/2019); Other surgical history (02/10/2019); and Other surgical history (02/10/2019).     Social History  He reports that he has never smoked. He has never used smokeless tobacco. He reports that he does not currently use alcohol. He reports that he does not use drugs.    Family History  Family History   Problem Relation Name Age of Onset    Other (malignant neoplasm) Mother      Other (cardia disorder) Father      Coronary artery disease Brother  50 - 59        -tob        Allergies  Patient has no known allergies.    Review of Symptoms:   Constitutional: Negative for chills, diaphoresis or fever  HENT: Negative for neck swelling  Eyes:.  Negative for eye pain  Respiratory:.  Negative for cough, shortness of breath or wheezing    Cardiovascular:.  Negative for chest pain or palpitations  Gastrointestinal:.  Negative for abdominal pain, nausea and vomiting  Genitourinary:.  Negative for urgency  Musculoskeletal:  Positive for back pain. Positive for joint pain. Denies falls within the past 3 months.  Skin: Negative for wounds or itching   Neurological: Negative for dizziness, seizures, loss of consciousness and weakness  Endo/Heme/Allergies: Does not bruise/bleed easily  Psychiatric/Behavioral: Negative for depression. The patient does not appear anxious.       PHYSICAL EXAM  Vitals signs reviewed  Constitutional:       General: Not in acute distress     Appearance: Normal appearance. Not ill-appearing.  HENT:     Head: Normocephalic and atraumatic  Eyes:     Conjunctiva/sclera:  Conjunctivae normal  Cardiovascular:     Rate and Rhythm: Normal rate and regular rhythm  Pulmonary:     Effort: No respiratory distress  Abdominal:     Palpations: Abdomen is soft  Musculoskeletal: ALVARADO  Skin:     General: Skin is warm and dry  Neurological:     General: No focal deficit present  Psychiatric:         Mood and Affect: Mood normal         Behavior: Behavior normal     Last Recorded Vitals  There were no vitals taken for this visit.    Relevant Results  Current Outpatient Medications   Medication Instructions    amLODIPine (NORVASC) 5 mg, oral, Daily    atorvastatin (LIPITOR) 10 mg, oral, Daily    Bacillus coagulans (PROBIOTIC, B. COAGULANS, ORAL) oral    budesonide-formoteroL (Symbicort) 80-4.5 mcg/actuation inhaler 2 puffs, inhalation, 2 times daily, Rinse mouth after use.    glucos sul 2KCl-msm-chond-C-Mn (Glucosamine Chondroitin) 550-30-1 mg capsule oral    levothyroxine (Synthroid, Levoxyl) 112 mcg tablet TAKE 1 TABLET BY MOUTH DAILY    losartan (Cozaar) 50 mg tablet TAKE 1 TABLET BY MOUTH DAILY AS  DIRECTED    methocarbamol (ROBAXIN) 750 mg, oral, Daily, PRN    multivitamin tablet oral    pantoprazole (PROTONIX) 40 mg, oral, Daily, DO NOT CRUSH CHEW OR SPLIT         MR lumbar spine wo IV contrast 02/02/2024    Narrative  Interpreted By:  Chastity Gama,  and Noe Owens  STUDY:  MR LUMBAR SPINE WO IV CONTRAST;  2/2/2024 10:25 am    INDICATION:  Signs/Symptoms:lumbar pain.    COMPARISON:  None.  Lumbar x-ray 01/24/2024    ACCESSION NUMBER(S):  [id number]    ORDERING CLINICIAN:  RAJWIDNER MORIN    TECHNIQUE:  Sagittal T1, T2, STIR, axial T1 and T2 weighted images of the lumbar  spine were acquired.    FINDINGS:  The last lumbar vertebral body is labeled L5.    Alignment: There is trace retrolisthesis of L1 on L2. There is grade  1 anterolisthesis of L4 on L5. Alignment is otherwise maintained.    Vertebrae/Intervertebral Discs: The vertebral bodies demonstrate  expected height. There is a focus T2  and T1 hyperintensity with loss  of signal on STIR imaging at L1 vertebral body suggestive of  hemangioma. The marrow signal is within normal limits. There is  multilevel disc desiccation and mild disc height loss at L4-L5 and  L5-S1.    Conus: The lower thoracic cord appears unremarkable. The conus  terminates at L1.    L5-S1: Disc bulge with a superimposed small central disc protrusion  and facet arthrosis. No spinal canal stenosis. Mild to moderate  neural foraminal stenosis.    L4-5: There is anterolisthesis of L4 on L5, disc bulge, ligamentum  flavum hypertrophy, and bilateral facet arthrosis causing mild  central canal stenosis. There is mild bilateral neural foraminal  narrowing.    L3-4: Disc bulge and facet arthrosis without significant central  canal stenosis. There is mild neural foraminal stenosis.    L2-3: There is ligamentum flavum hypertrophy and mild facet arthrosis  without significant central canal stenosis or neural foraminal  stenosis.    L1-2: There is retrolisthesis of L1 on L2, disc bulge and mild facet  arthrosis without significant central canal stenosis. Moderate  bilateral neural foraminal stenosis.    T12-L1: No posterior disc contour abnormality. There is no  significant central canal or neural foraminal stenosis.    The paraspinous soft tissues are unremarkable. Incompletely evaluated  subcentimeter T2 hyperintense lesion within the left kidney likely  represents a cyst.    Impression  Multilevel degenerative disc disease and facet arthrosis without  significant spinal canal stenosis. Mild-to-moderate neural foraminal  narrowing as detailed above.    I personally reviewed the images/study and I agree with Dr. Roman Liu findings as stated. This study was interpreted at [place], [place]    MACRO:  None    Signed by: Chastity Gama 2/2/2024 2:02 PM  Dictation workstation:   [id number]     No image results found.       No diagnosis found.      ASSESSMENT/PLAN  Miguel May is a 68 y.o. male with a past medical history of bilateral axial low back pain who presents for Bilateral diagnostic L3 and L4 medial branch nerve blocks and bilateral L5 posterior rami block using fluoroscopic guidance, #2    Patient denies any recent antibiotic use or infections, denies any blood thinner use, and denies contrast or local anesthetic allergies     Risks, benefits, alternatives discussed. All questions answered to the best of my ability. Patient agrees to proceed.      Our plan is as follows:  - Proceed with aforementioned procedure          Angelina Suarez DO   Pain fellow

## 2024-04-05 NOTE — PROCEDURES
Pre-Op Diagnosis: Lumbosacral spondylosis   Post-Procedure Diagnosis: Same as preop diagnosis  Procedure: Bilateral L3 and L4 medial branch nerve block and bilateral L5 posterior rami nerve blocks using fluoroscopic guidance  Surgeon: Carol Hurtado MD, PhD   Resident/Fellow/Other Assistant: Angelina Suarez DO     Procedure Note:     Mr. Miguel May is a 68 y.o. male with bilateral lumbar pain suspected to be related to spondylosis presents for diagnostic lumbar facet injection, his second.  Following informed consent, the patient was brought to the operating room and placed in the prone position.  The low back area was prepped with chlorhexidine and draped in the usual sterile fashion.  The procedure was performed on the right side and then repeated similarly on the left side.  Using fluoroscopic guidance, 25-gauge spinal needles were advanced under fluoroscopic guidance to the appropriate target sites and needle positions were confirmed in AP and lateral views.  Injection of the small amount of iohexol contrast at each needle tip did not result with vascular uptake.  As such, 0.5 cc of 2% lidocaine was applied at each needle tip and the needles were then removed.  The patient was transferred to the recovery room in stable condition.   In the recovery area, the patient reported 80% pain relief.  Given that this is his second successful diagnostic lumbar facet procedure, he will be scheduled next for radiofrequency neurotomy of the lower lumbar facet nerves.

## 2024-04-06 ENCOUNTER — LAB (OUTPATIENT)
Dept: LAB | Facility: LAB | Age: 69
End: 2024-04-06
Payer: MEDICARE

## 2024-04-06 DIAGNOSIS — K21.9 GASTROESOPHAGEAL REFLUX DISEASE WITHOUT ESOPHAGITIS: ICD-10-CM

## 2024-04-06 PROCEDURE — 87449 NOS EACH ORGANISM AG IA: CPT

## 2024-04-09 LAB — H PYLORI AG STL QL IA: NEGATIVE

## 2024-04-16 ENCOUNTER — TELEPHONE (OUTPATIENT)
Dept: CARDIOLOGY | Facility: CLINIC | Age: 69
End: 2024-04-16
Payer: MEDICARE

## 2024-04-16 ENCOUNTER — TELEPHONE (OUTPATIENT)
Dept: PRIMARY CARE | Facility: CLINIC | Age: 69
End: 2024-04-16
Payer: MEDICARE

## 2024-04-16 NOTE — TELEPHONE ENCOUNTER
Pt left message stating he is having palpitations in his left arm pit close to his heart.  It kept him up last night and continues to bother him today.  Spoke with Dr. Hurtado as he is a PRN pt not seen in 3 yrs.  Per Dr. Hurtado schedule as new pt visit.

## 2024-04-16 NOTE — TELEPHONE ENCOUNTER
Pt left a msg stating that he is having a muscle spasms near his left armpit,  He is concerned that it might be cardiac related, and wants to have an EKG to make sure it's not cardiac related.

## 2024-04-18 ENCOUNTER — OFFICE VISIT (OUTPATIENT)
Dept: CARDIOLOGY | Facility: CLINIC | Age: 69
End: 2024-04-18
Payer: MEDICARE

## 2024-04-18 VITALS
HEART RATE: 66 BPM | HEIGHT: 69 IN | TEMPERATURE: 98.6 F | DIASTOLIC BLOOD PRESSURE: 76 MMHG | SYSTOLIC BLOOD PRESSURE: 148 MMHG | WEIGHT: 194.9 LBS | BODY MASS INDEX: 28.87 KG/M2

## 2024-04-18 DIAGNOSIS — I47.10 SVT (SUPRAVENTRICULAR TACHYCARDIA) (CMS-HCC): ICD-10-CM

## 2024-04-18 DIAGNOSIS — R07.89 CHEST DISCOMFORT: Primary | ICD-10-CM

## 2024-04-18 DIAGNOSIS — E78.2 MIXED HYPERLIPIDEMIA: ICD-10-CM

## 2024-04-18 DIAGNOSIS — I10 BENIGN HYPERTENSION: ICD-10-CM

## 2024-04-18 PROBLEM — R00.2 PALPITATIONS: Status: ACTIVE | Noted: 2024-04-18

## 2024-04-18 PROCEDURE — 1160F RVW MEDS BY RX/DR IN RCRD: CPT | Performed by: INTERNAL MEDICINE

## 2024-04-18 PROCEDURE — 1036F TOBACCO NON-USER: CPT | Performed by: INTERNAL MEDICINE

## 2024-04-18 PROCEDURE — 99204 OFFICE O/P NEW MOD 45 MIN: CPT | Performed by: INTERNAL MEDICINE

## 2024-04-18 PROCEDURE — 1159F MED LIST DOCD IN RCRD: CPT | Performed by: INTERNAL MEDICINE

## 2024-04-18 PROCEDURE — 3078F DIAST BP <80 MM HG: CPT | Performed by: INTERNAL MEDICINE

## 2024-04-18 PROCEDURE — 99214 OFFICE O/P EST MOD 30 MIN: CPT | Performed by: INTERNAL MEDICINE

## 2024-04-18 PROCEDURE — 3077F SYST BP >= 140 MM HG: CPT | Performed by: INTERNAL MEDICINE

## 2024-04-18 RX ORDER — POLYETHYLENE GLYCOL-3350 AND ELECTROLYTES 236; 6.74; 5.86; 2.97; 22.74 G/274.31G; G/274.31G; G/274.31G; G/274.31G; G/274.31G
POWDER, FOR SOLUTION ORAL
COMMUNITY
Start: 2024-04-05 | End: 2024-05-28 | Stop reason: WASHOUT

## 2024-04-18 NOTE — PROGRESS NOTES
Chief Complaint:   Chest Pain     History of Present Illness     Miguel May is a 68 y.o. male presenting with chest pain.  The patient complains that 2 days ago he experienced throbbing intermittent non-radiating discomfort in left axilla at rest x 12 hrs and no recurrence.  FHX of CAD, HTN, HPL.  No recurrent palpitations s/p RFA for SVT. The patient has no history of known coronary artery disease.  The patient has not had a stress test within the last 12 months and has never had cardiac catheterization.  The patient exercises and does not experience chest discomfort with exertion.  There is no history of aortic aneurysm or thromboembolism.  The patient denies any systemic complaints including fever. Exercises.  No exertional CP/throbbing.  Under stress.    Review of Systems  All pertinent systems have been reviewed and are negative except for what is stated in the history of present illness.    All other systems have been reviewed and are negative and noncontributory to this patient's current ailments.   .       Previous History     Past Medical History:  He has a past medical history of Chest discomfort (04/18/2024), Pain in right hand (08/25/2020), Palpitations (04/18/2024), and SVT (supraventricular tachycardia) (CMS-HCC) (04/18/2024).    Past Surgical History:  He has a past surgical history that includes Other surgical history (02/10/2019); Other surgical history (02/10/2019); Other surgical history (02/10/2019); and Other surgical history (02/10/2019).      Social History:  He reports that he has never smoked. He has never used smokeless tobacco. He reports current alcohol use. He reports that he does not use drugs.    Family History:  Family History   Problem Relation Name Age of Onset    Other (malignant neoplasm) Mother      Other (cardia disorder) Father      Coronary artery disease Brother  50 - 59        -tob        Allergies:  Patient has no known allergies.    Outpatient Medications:  Current  "Outpatient Medications   Medication Instructions    amLODIPine (NORVASC) 5 mg, oral, Daily    atorvastatin (LIPITOR) 10 mg, oral, Daily    Bacillus coagulans (PROBIOTIC, B. COAGULANS, ORAL) oral    budesonide-formoteroL (Symbicort) 80-4.5 mcg/actuation inhaler 2 puffs, inhalation, 2 times daily, Rinse mouth after use.    GaviLyte-G 236-22.74-6.74 -5.86 gram solution     glucos sul 2KCl-msm-chond-C-Mn (Glucosamine Chondroitin) 550-30-1 mg capsule oral    levothyroxine (Synthroid, Levoxyl) 112 mcg tablet TAKE 1 TABLET BY MOUTH DAILY    losartan (Cozaar) 50 mg tablet TAKE 1 TABLET BY MOUTH DAILY AS  DIRECTED    methocarbamol (ROBAXIN) 750 mg, oral, Daily, PRN    multivitamin tablet oral    pantoprazole (PROTONIX) 40 mg, oral, Daily, DO NOT CRUSH CHEW OR SPLIT       Physical Examination   Vitals:  Visit Vitals  /76   Pulse 66   Temp 37 °C (98.6 °F)   Ht 1.753 m (5' 9\")   Wt 88.4 kg (194 lb 14.4 oz)   BMI 28.78 kg/m²   Smoking Status Never   BSA 2.07 m²    Physical Exam  Vitals reviewed.   Constitutional:       General: He is not in acute distress.     Appearance: Normal appearance.   HENT:      Head: Normocephalic and atraumatic.      Nose: Nose normal.   Eyes:      Conjunctiva/sclera: Conjunctivae normal.   Cardiovascular:      Rate and Rhythm: Normal rate and regular rhythm.      Pulses: Normal pulses.      Heart sounds: No murmur heard.  Pulmonary:      Effort: Pulmonary effort is normal. No respiratory distress.      Breath sounds: Normal breath sounds. No wheezing, rhonchi or rales.   Abdominal:      General: Bowel sounds are normal. There is no distension.      Palpations: Abdomen is soft.      Tenderness: There is no abdominal tenderness.   Musculoskeletal:         General: No swelling.      Right lower leg: No edema.      Left lower leg: No edema.   Skin:     General: Skin is warm and dry.      Capillary Refill: Capillary refill takes less than 2 seconds.   Neurological:      General: No focal deficit " present.      Mental Status: He is alert.   Psychiatric:         Mood and Affect: Mood normal.              Labs/Imaging/Cardiac Studies     Last Labs:  CBC -  Lab Results   Component Value Date    WBC 8.1 04/04/2024    HGB 12.9 (L) 04/04/2024    HCT 41.8 04/04/2024    MCV 76 (L) 04/04/2024     04/04/2024       CMP -  Lab Results   Component Value Date    CALCIUM 8.7 09/22/2023    PROT 6.4 08/05/2019    ALBUMIN 3.9 08/05/2019    AST 18 08/05/2019    ALT 13 09/22/2023    ALKPHOS 62 08/05/2019    BILITOT 1.0 08/05/2019       LIPID PANEL -   Lab Results   Component Value Date    CHOL 167 09/22/2023    HDL 62.2 09/22/2023    CHHDL 2.7 09/22/2023    VLDL 12 09/22/2023    TRIG 58 09/22/2023       RENAL FUNCTION PANEL -   Lab Results   Component Value Date    K 4.5 09/22/2023       Lab Results   Component Value Date    HGBA1C 5.8 (A) 09/22/2023       ECG:NSR    Echo:  No echocardiogram results found for the past 12 months       Assessment and Recommendations     Assessment/Plan     1. Chest discomfort  The patient presents with atypical, chest pain.  The ECG is non-ischemic.  The differential diagnosis includes CAD, gastrointestinal, pulmonary, and musculoskeletal etiologies.  There is no clinical evidence to suggest acute coronary syndrome, aortic dissection, or pulmonary embolism.  The ECG shows no evidence of ischemia.  An outpatient evaluation of the patient's chest pain is appropriate with a stress test which will be scheduled as soon as can be arranged. For severe and/or prolonged chest pain, the patient was instructed to call 911.   2. SVT (supraventricular tachycardia) (CMS-HCC)  No recurrence.    3. Benign hypertension  Usually well controlled at home.    4. Mixed hyperlipidemia  The patient's lipids are well controlled on chronic statin therapy and they are meeting their goal LDL cholesterol per the ACC/AHA guidelines.             Rashel Hurtado MD    Exclusive of any other services or procedures performed,  I, Rashel Hurtado MD , spent 30 minutes in duration for this visit today.  This time consisted of chart review, obtaining history, and/or performing the exam as documented above as well as documenting the clinical information for the encounter in the electronic record, discussing treatment options, plans, and/or goals with patient, family, and/or caregiver, refilling medications, updating the electronic record, ordering medicines, lab work, imaging, referrals, and/or procedures as documented above and communicating with other [facility] professionals. I have discussed the results of laboratory, radiology, and cardiology studies with the patient and their family/caregiver.

## 2024-04-19 DIAGNOSIS — E78.5 HYPERLIPIDEMIA, UNSPECIFIED HYPERLIPIDEMIA TYPE: ICD-10-CM

## 2024-04-19 RX ORDER — ATORVASTATIN CALCIUM 10 MG/1
10 TABLET, FILM COATED ORAL DAILY
Qty: 90 TABLET | Refills: 3 | Status: SHIPPED | OUTPATIENT
Start: 2024-04-19 | End: 2025-04-19

## 2024-04-19 NOTE — TELEPHONE ENCOUNTER
Pt went and saw Dr. Hurtado and he did an EKG and it came out good.  He will be going back for a stress test and such due to family HX of cardiac issues.

## 2024-04-23 ENCOUNTER — APPOINTMENT (OUTPATIENT)
Dept: RADIOLOGY | Facility: HOSPITAL | Age: 69
End: 2024-04-23
Payer: MEDICARE

## 2024-04-24 DIAGNOSIS — Z12.11 COLON CANCER SCREENING: Primary | ICD-10-CM

## 2024-04-24 RX ORDER — SODIUM, POTASSIUM,MAG SULFATES 17.5-3.13G
1 SOLUTION, RECONSTITUTED, ORAL ORAL 2 TIMES DAILY
Qty: 1 EACH | Refills: 0 | Status: SHIPPED | OUTPATIENT
Start: 2024-04-24 | End: 2024-04-26

## 2024-04-25 RX ORDER — SODIUM CHLORIDE, SODIUM LACTATE, POTASSIUM CHLORIDE, CALCIUM CHLORIDE 600; 310; 30; 20 MG/100ML; MG/100ML; MG/100ML; MG/100ML
20 INJECTION, SOLUTION INTRAVENOUS CONTINUOUS
Status: CANCELLED | OUTPATIENT
Start: 2024-04-25

## 2024-04-25 NOTE — H&P
History Of Present Illness  Miguel May is a 68 y.o. male presenting with iron def anemia.     Past Medical History  Past Medical History:   Diagnosis Date    Chest discomfort 04/18/2024    Pain in right hand 08/25/2020    Pain of right hand    Palpitations 04/18/2024    SVT (supraventricular tachycardia) (CMS-HCC) 04/18/2024    S/p RFA 2013     Surgical History  Past Surgical History:   Procedure Laterality Date    OTHER SURGICAL HISTORY  02/10/2019    Wrist surgery    OTHER SURGICAL HISTORY  02/10/2019    Catheter ablation    OTHER SURGICAL HISTORY  02/10/2019    Temporomandibular joint arthroscopy    OTHER SURGICAL HISTORY  02/10/2019    Tonsillectomy     Social History  He reports that he has never smoked. He has never used smokeless tobacco. He reports current alcohol use. He reports that he does not use drugs.    Family History  Family History   Problem Relation Name Age of Onset    Other (malignant neoplasm) Mother      Other (cardia disorder) Father      Coronary artery disease Brother  50 - 59        -tob        Allergies  No Known Allergies  Review of Systems     Physical Exam  Vitals and nursing note reviewed.   Constitutional:       Appearance: Normal appearance.   Cardiovascular:      Rate and Rhythm: Normal rate and regular rhythm.      Pulses: Normal pulses.      Heart sounds: Normal heart sounds.   Pulmonary:      Effort: Pulmonary effort is normal.      Breath sounds: Normal breath sounds.   Abdominal:      General: Abdomen is flat.      Palpations: Abdomen is soft.   Neurological:      Mental Status: He is alert.          Last Recorded Vitals  There were no vitals taken for this visit.    Assessment/Plan   Iron Def anemia    Proceed with EGD     Weston Gale MD

## 2024-04-26 ENCOUNTER — ANESTHESIA EVENT (OUTPATIENT)
Dept: GASTROENTEROLOGY | Facility: HOSPITAL | Age: 69
End: 2024-04-26
Payer: MEDICARE

## 2024-04-26 ENCOUNTER — HOSPITAL ENCOUNTER (OUTPATIENT)
Dept: GASTROENTEROLOGY | Facility: HOSPITAL | Age: 69
Setting detail: OUTPATIENT SURGERY
Discharge: HOME | End: 2024-04-26
Payer: MEDICARE

## 2024-04-26 ENCOUNTER — ANESTHESIA (OUTPATIENT)
Dept: GASTROENTEROLOGY | Facility: HOSPITAL | Age: 69
End: 2024-04-26
Payer: MEDICARE

## 2024-04-26 VITALS
HEIGHT: 69 IN | OXYGEN SATURATION: 99 % | BODY MASS INDEX: 28.05 KG/M2 | DIASTOLIC BLOOD PRESSURE: 68 MMHG | RESPIRATION RATE: 16 BRPM | WEIGHT: 189.38 LBS | TEMPERATURE: 96.8 F | SYSTOLIC BLOOD PRESSURE: 128 MMHG | HEART RATE: 58 BPM

## 2024-04-26 DIAGNOSIS — D50.9 IRON DEFICIENCY ANEMIA, UNSPECIFIED IRON DEFICIENCY ANEMIA TYPE: ICD-10-CM

## 2024-04-26 PROCEDURE — 88305 TISSUE EXAM BY PATHOLOGIST: CPT | Performed by: STUDENT IN AN ORGANIZED HEALTH CARE EDUCATION/TRAINING PROGRAM

## 2024-04-26 PROCEDURE — A45378 PR COLONOSCOPY,DIAGNOSTIC: Performed by: ANESTHESIOLOGIST ASSISTANT

## 2024-04-26 PROCEDURE — 2500000001 HC RX 250 WO HCPCS SELF ADMINISTERED DRUGS (ALT 637 FOR MEDICARE OP): Performed by: PHARMACIST

## 2024-04-26 PROCEDURE — 43239 EGD BIOPSY SINGLE/MULTIPLE: CPT | Performed by: INTERNAL MEDICINE

## 2024-04-26 PROCEDURE — 3700000001 HC GENERAL ANESTHESIA TIME - INITIAL BASE CHARGE

## 2024-04-26 PROCEDURE — 88305 TISSUE EXAM BY PATHOLOGIST: CPT | Mod: TC,AHULAB | Performed by: INTERNAL MEDICINE

## 2024-04-26 PROCEDURE — A45378 PR COLONOSCOPY,DIAGNOSTIC: Performed by: ANESTHESIOLOGY

## 2024-04-26 PROCEDURE — 7100000009 HC PHASE TWO TIME - INITIAL BASE CHARGE

## 2024-04-26 PROCEDURE — 2500000004 HC RX 250 GENERAL PHARMACY W/ HCPCS (ALT 636 FOR OP/ED): Performed by: ANESTHESIOLOGIST ASSISTANT

## 2024-04-26 PROCEDURE — 2500000001 HC RX 250 WO HCPCS SELF ADMINISTERED DRUGS (ALT 637 FOR MEDICARE OP): Performed by: ANESTHESIOLOGY

## 2024-04-26 PROCEDURE — G0121 COLON CA SCRN NOT HI RSK IND: HCPCS | Performed by: INTERNAL MEDICINE

## 2024-04-26 PROCEDURE — 45378 DIAGNOSTIC COLONOSCOPY: CPT | Performed by: INTERNAL MEDICINE

## 2024-04-26 PROCEDURE — 3700000002 HC GENERAL ANESTHESIA TIME - EACH INCREMENTAL 1 MINUTE

## 2024-04-26 PROCEDURE — 7100000010 HC PHASE TWO TIME - EACH INCREMENTAL 1 MINUTE

## 2024-04-26 RX ORDER — MIDAZOLAM HYDROCHLORIDE 1 MG/ML
INJECTION INTRAMUSCULAR; INTRAVENOUS AS NEEDED
Status: DISCONTINUED | OUTPATIENT
Start: 2024-04-26 | End: 2024-04-26

## 2024-04-26 RX ORDER — SODIUM CHLORIDE, SODIUM LACTATE, POTASSIUM CHLORIDE, CALCIUM CHLORIDE 600; 310; 30; 20 MG/100ML; MG/100ML; MG/100ML; MG/100ML
INJECTION, SOLUTION INTRAVENOUS CONTINUOUS PRN
Status: DISCONTINUED | OUTPATIENT
Start: 2024-04-26 | End: 2024-04-26

## 2024-04-26 RX ORDER — DICLOFENAC SODIUM 1 MG/ML
1 SOLUTION/ DROPS OPHTHALMIC 4 TIMES DAILY
Status: DISCONTINUED | OUTPATIENT
Start: 2024-04-26 | End: 2024-04-27 | Stop reason: HOSPADM

## 2024-04-26 RX ORDER — CARBOXYMETHYLCELLULOSE SODIUM 5 MG/ML
1 SOLUTION/ DROPS OPHTHALMIC AS NEEDED
Status: DISCONTINUED | OUTPATIENT
Start: 2024-04-26 | End: 2024-04-27 | Stop reason: HOSPADM

## 2024-04-26 RX ORDER — PROPOFOL 10 MG/ML
INJECTION, EMULSION INTRAVENOUS CONTINUOUS PRN
Status: DISCONTINUED | OUTPATIENT
Start: 2024-04-26 | End: 2024-04-26

## 2024-04-26 RX ORDER — FENTANYL CITRATE 50 UG/ML
INJECTION, SOLUTION INTRAMUSCULAR; INTRAVENOUS AS NEEDED
Status: DISCONTINUED | OUTPATIENT
Start: 2024-04-26 | End: 2024-04-26

## 2024-04-26 RX ADMIN — DICLOFENAC SODIUM 1 DROP: 1 SOLUTION OPHTHALMIC at 13:08

## 2024-04-26 RX ADMIN — SODIUM CHLORIDE, POTASSIUM CHLORIDE, SODIUM LACTATE AND CALCIUM CHLORIDE: 600; 310; 30; 20 INJECTION, SOLUTION INTRAVENOUS at 11:47

## 2024-04-26 RX ADMIN — CARBOXYMETHYLCELLULOSE SODIUM 1 DROP: 5 SOLUTION/ DROPS OPHTHALMIC at 13:32

## 2024-04-26 RX ADMIN — PROPOFOL 150 MCG/KG/MIN: 10 INJECTION, EMULSION INTRAVENOUS at 11:51

## 2024-04-26 RX ADMIN — MIDAZOLAM HYDROCHLORIDE 2 MG: 1 INJECTION INTRAMUSCULAR; INTRAVENOUS at 11:47

## 2024-04-26 RX ADMIN — FENTANYL CITRATE 50 MCG: 50 INJECTION, SOLUTION INTRAMUSCULAR; INTRAVENOUS at 11:51

## 2024-04-26 ASSESSMENT — COLUMBIA-SUICIDE SEVERITY RATING SCALE - C-SSRS
6. HAVE YOU EVER DONE ANYTHING, STARTED TO DO ANYTHING, OR PREPARED TO DO ANYTHING TO END YOUR LIFE?: NO
2. HAVE YOU ACTUALLY HAD ANY THOUGHTS OF KILLING YOURSELF?: NO
1. IN THE PAST MONTH, HAVE YOU WISHED YOU WERE DEAD OR WISHED YOU COULD GO TO SLEEP AND NOT WAKE UP?: NO

## 2024-04-26 ASSESSMENT — PAIN SCALES - GENERAL
PAINLEVEL_OUTOF10: 0 - NO PAIN

## 2024-04-26 ASSESSMENT — PAIN - FUNCTIONAL ASSESSMENT
PAIN_FUNCTIONAL_ASSESSMENT: 0-10
PAIN_FUNCTIONAL_ASSESSMENT: 0-10
PAIN_FUNCTIONAL_ASSESSMENT: UNABLE TO SELF-REPORT

## 2024-04-26 NOTE — ANESTHESIA POSTPROCEDURE EVALUATION
Patient: Miguel Tariq    Procedure Summary       Date: 04/26/24 Room / Location: [facility]    Anesthesia Start: 1147 Anesthesia Stop: 1227    Procedures:       EGD      COLONOSCOPY Diagnosis: Iron deficiency anemia, unspecified iron deficiency anemia type    Scheduled Providers: Weston Gale MD; Aleah Epstein RN; Laura Gilliland RN; Efren Carrera MD; YANNICK Martines Responsible Provider: Reynaldo Bailey MD    Anesthesia Type: MAC ASA Status: 2            Anesthesia Type: MAC    Vitals Value Taken Time   /58 04/26/24 1253   Temp 36 °C (96.8 °F) 04/26/24 1223   Pulse 55 04/26/24 1253   Resp 18 04/26/24 1253   SpO2 100 % 04/26/24 1253       Anesthesia Post Evaluation    Patient participation: complete - patient participated  Level of consciousness: awake  Pain management: adequate  Airway patency: patent  Cardiovascular status: acceptable and hemodynamically stable  Respiratory status: acceptable  Hydration status: acceptable  Postoperative Nausea and Vomiting: none  Comments: Pt complained of L watery eye irritation, itching, foreign body sensation.  On exam saw some mild irritation but no foreign body or sign of trauma.  Vision intact.  Appears to have likely mild corneal abrasion.  Discussed with pt that the vast majority improve on their own (often within a day), but if it worsens later or does not show some improvement by tomorrow he may need to go to the ER or ophtho.  Will give him some diclofenac eye gtt and artificial tears.        No notable events documented.

## 2024-04-26 NOTE — POST-PROCEDURE NOTE
1223 Received patient from the procedure accompanied by RN and AA. Patient sedated upon arrival, O2 at 6L simple face mask. Abdomen soft    1235 Patient more awake, O2 removed    1246 Patient complaining of his left eye being scratchy, teary and red. Dr. Bailey at bedside and talked to patient and wife at bedside.    1253 Patient tolerating crackers and ginger ale    1300 Homegoing instructions reviewed with patient and wife and they verbalized understanding, copies furnished.    1308 Voltaren eye drops given. Dr. Gale at bedside and talked to patient and wife    1320 IV removed without problems    1332 Eye drops given, barcode cannot be scanned, pharmacist Santo aware.    1347 Discharged in satisfactory condition via wheelchair

## 2024-04-26 NOTE — ANESTHESIA PREPROCEDURE EVALUATION
Patient: Miguel Tariq    Procedure Information       Date/Time: 04/26/24 1100    Scheduled providers: Weston Gale MD; Aleah Epstein RN; Laura Gilliland RN; Efren Carrera MD; YANNICK Martines    Procedures:       EGD      COLONOSCOPY    Location: [facility]            Relevant Problems   Cardiac   (+) Benign hypertension   (+) Hyperlipidemia   (+) SVT (supraventricular tachycardia) (CMS-HCC)      Pulmonary   (+) Asthma (HHS-HCC)      Endocrine   (+) Adult hypothyroidism       Clinical information reviewed:   Tobacco  Allergies  Meds   Med Hx  Surg Hx   Fam Hx  Soc Hx         Past Medical History:   Diagnosis Date    Asthma (HHS-HCC)     GERD (gastroesophageal reflux disease)     HTN (hypertension)     Hyperlipidemia     Hypothyroidism     SVT (supraventricular tachycardia) (CMS-HCC) 04/18/2024    S/p RFA 2013      Past Surgical History:   Procedure Laterality Date    CARDIAC ELECTROPHYSIOLOGY MAPPING AND ABLATION      COLONOSCOPY      TEMPOROMANDIBULAR JOINT SURGERY      arthroscopy    TONSILLECTOMY      WRIST SURGERY Right      Social History     Tobacco Use    Smoking status: Never    Smokeless tobacco: Never   Vaping Use    Vaping status: Never Used   Substance Use Topics    Alcohol use: Yes     Comment: social    Drug use: Never      Current Outpatient Medications   Medication Instructions    amLODIPine (NORVASC) 5 mg, oral, Daily    atorvastatin (LIPITOR) 10 mg, oral, Daily    Bacillus coagulans (PROBIOTIC, B. COAGULANS, ORAL) oral    budesonide-formoteroL (Symbicort) 80-4.5 mcg/actuation inhaler 2 puffs, inhalation, 2 times daily, Rinse mouth after use.    GaviLyte-G 236-22.74-6.74 -5.86 gram solution     glucos sul 2KCl-msm-chond-C-Mn (Glucosamine Chondroitin) 550-30-1 mg capsule oral    levothyroxine (Synthroid, Levoxyl) 112 mcg tablet TAKE 1 TABLET BY MOUTH DAILY    losartan (Cozaar) 50 mg tablet TAKE 1 TABLET BY MOUTH DAILY AS  DIRECTED    methocarbamol  "(ROBAXIN) 750 mg, oral, Daily, PRN    multivitamin tablet oral    pantoprazole (PROTONIX) 40 mg, oral, Daily, DO NOT CRUSH CHEW OR SPLIT    sodium,potassium,mag sulfates (Suprep Bowel Prep Kit) 17.5-3.13-1.6 gram recon soln solution 1 bottle, oral, 2 times daily, Follow [facility] Supr instructions      No Known Allergies     Chemistry    Lab Results   Component Value Date/Time     09/22/2023 0904    K 4.5 09/22/2023 0904     (H) 09/22/2023 0904    CO2 27 09/22/2023 0904    BUN 14 09/22/2023 0904    CREATININE 1.14 09/22/2023 0904    Lab Results   Component Value Date/Time    CALCIUM 8.7 09/22/2023 0904    ALKPHOS 62 08/05/2019 0850    AST 18 08/05/2019 0850    ALT 13 09/22/2023 0904    BILITOT 1.0 08/05/2019 0850          Lab Results   Component Value Date    HGBA1C 5.8 (A) 09/22/2023     Lab Results   Component Value Date/Time    WBC 8.1 04/04/2024 1523    HGB 12.9 (L) 04/04/2024 1523    HCT 41.8 04/04/2024 1523     04/04/2024 1523     No results found for: \"PROTIME\", \"PTT\", \"INR\"  No results found for: \"ABORH\"  No results found for this or any previous visit (from the past 4464 hour(s)).  No results found for this or any previous visit from the past 1095 days.     Stress echo 4/29/2021:  Baseline Echo: Normal left ventricular size and function. LV septal wall thickness is normal. The resting baseline ejection fraction was estimated at 55 to 60%. There are no regional wall motion abnormalities at baseline.   Stress Echo: Normal left ventricular size and function during peak stress. There are no stress induced regional wall motion abnormalities. The ejection fraction is approximately 70 to 75% at peak stress.  Summary:   1. The resting ejection fraction was estimated at 55 to 60% with a peak exercise ejection fraction estimated at 70 to 75%.   2. No clinical, echocardiographic or electrocardiographic evidence for ischemia at a maximal workload.   3. Normal ECG.   4. Normal Stress " "Test.   5. The adequate level of stress was achieved.  Visit Vitals  /68   Pulse 59   Temp 36.4 °C (97.5 °F)   Resp 16   Ht 1.753 m (5' 9\")   Wt 85.9 kg (189 lb 6 oz)   SpO2 98%   BMI 27.97 kg/m²   Smoking Status Never   BSA 2.05 m²     NPO/Void Status  Date of Last Liquid: 04/26/24  Time of Last Liquid: 0700  Date of Last Solid: 04/24/24  Time of Last Solid: 1730  Last Intake Type: Clear fluids  Time of Last Void: 0945         Physical Exam    Airway  Mallampati: III  TM distance: >3 FB  Neck ROM: full     Cardiovascular   Rhythm: regular  Rate: normal     Dental - normal exam     Pulmonary   Breath sounds clear to auscultation     Abdominal - normal exam              Anesthesia Plan    History of general anesthesia?: yes  History of complications of general anesthesia?: no    ASA 2     MAC   (With standard ASA monitoring.)  intravenous induction   Anesthetic plan and risks discussed with patient.    Plan discussed with CRNA and CAA.        "

## 2024-04-26 NOTE — DISCHARGE INSTRUCTIONS

## 2024-04-29 ASSESSMENT — PAIN SCALES - GENERAL: PAINLEVEL_OUTOF10: 0 - NO PAIN

## 2024-05-02 LAB
LABORATORY COMMENT REPORT: NORMAL
PATH REPORT.FINAL DX SPEC: NORMAL
PATH REPORT.GROSS SPEC: NORMAL
PATH REPORT.TOTAL CANCER: NORMAL

## 2024-05-07 ENCOUNTER — HOSPITAL ENCOUNTER (OUTPATIENT)
Dept: RADIOLOGY | Facility: HOSPITAL | Age: 69
Discharge: HOME | End: 2024-05-07
Payer: MEDICARE

## 2024-05-07 VITALS
TEMPERATURE: 97.9 F | DIASTOLIC BLOOD PRESSURE: 61 MMHG | HEIGHT: 69 IN | SYSTOLIC BLOOD PRESSURE: 105 MMHG | WEIGHT: 190 LBS | HEART RATE: 56 BPM | RESPIRATION RATE: 16 BRPM | BODY MASS INDEX: 28.14 KG/M2 | OXYGEN SATURATION: 97 %

## 2024-05-07 DIAGNOSIS — M54.40 ACUTE RIGHT-SIDED LOW BACK PAIN WITH SCIATICA, SCIATICA LATERALITY UNSPECIFIED: ICD-10-CM

## 2024-05-07 PROCEDURE — 99153 MOD SED SAME PHYS/QHP EA: CPT | Performed by: ANESTHESIOLOGY

## 2024-05-07 PROCEDURE — 64636 DESTROY L/S FACET JNT ADDL: CPT | Performed by: ANESTHESIOLOGY

## 2024-05-07 PROCEDURE — 2720000007 HC OR 272 NO HCPCS

## 2024-05-07 PROCEDURE — 64635 DESTROY LUMB/SAC FACET JNT: CPT | Mod: 50 | Performed by: ANESTHESIOLOGY

## 2024-05-07 PROCEDURE — 2500000004 HC RX 250 GENERAL PHARMACY W/ HCPCS (ALT 636 FOR OP/ED): Performed by: ANESTHESIOLOGY

## 2024-05-07 PROCEDURE — 64635 DESTROY LUMB/SAC FACET JNT: CPT | Performed by: ANESTHESIOLOGY

## 2024-05-07 PROCEDURE — 99152 MOD SED SAME PHYS/QHP 5/>YRS: CPT | Performed by: ANESTHESIOLOGY

## 2024-05-07 RX ORDER — FENTANYL CITRATE 50 UG/ML
INJECTION, SOLUTION INTRAMUSCULAR; INTRAVENOUS
Status: COMPLETED | OUTPATIENT
Start: 2024-05-07 | End: 2024-05-07

## 2024-05-07 RX ORDER — MIDAZOLAM HYDROCHLORIDE 1 MG/ML
INJECTION INTRAMUSCULAR; INTRAVENOUS
Status: COMPLETED | OUTPATIENT
Start: 2024-05-07 | End: 2024-05-07

## 2024-05-07 RX ADMIN — MIDAZOLAM HYDROCHLORIDE 2 MG: 1 INJECTION INTRAMUSCULAR; INTRAVENOUS at 10:45

## 2024-05-07 RX ADMIN — FENTANYL CITRATE 50 MCG: 50 INJECTION, SOLUTION INTRAMUSCULAR; INTRAVENOUS at 10:45

## 2024-05-07 ASSESSMENT — PAIN SCALES - GENERAL
PAINLEVEL_OUTOF10: 0 - NO PAIN
PAINLEVEL_OUTOF10: 3
PAINLEVEL_OUTOF10: 0 - NO PAIN
PAINLEVEL_OUTOF10: 0 - NO PAIN
PAINLEVEL_OUTOF10: 2
PAINLEVEL_OUTOF10: 2
PAINLEVEL_OUTOF10: 0 - NO PAIN
PAINLEVEL_OUTOF10: 2
PAINLEVEL_OUTOF10: 0 - NO PAIN
PAINLEVEL_OUTOF10: 2
PAINLEVEL_OUTOF10: 0 - NO PAIN
PAINLEVEL_OUTOF10: 3

## 2024-05-07 ASSESSMENT — PAIN - FUNCTIONAL ASSESSMENT
PAIN_FUNCTIONAL_ASSESSMENT: 0-10

## 2024-05-07 NOTE — PROCEDURES
Pre-Op Diagnosis: Lumbosacral spondylosis   Post-Procedure Diagnosis: Same as preop diagnosis  Procedure: Bilateral L3 and L4 medial branch radiofrequency neurotomy and bilateral L5 posterior rami radiofrequency neurotomy using fluoroscopic guidance  Surgeon: Carol Hurtado MD, PhD   Resident/Fellow/Other Assistant: Angelina Suarez DO     Procedure Note:     Mr. Miguel Tariq is a 68 y.o. male with lumbosacral spondylosis presenting today for bilateral lumbar facet radiofrequency ablation having had successful diagnostic injections.  The patient was identified in the preoperative area, and informed consent was obtained.  The patient was then brought to the operating room and timeout was performed appropriately.  The patient was placed in the prone position and the low back area was prepped with chlorhexidine and draped in the usual sterile fashion.  Using fluoroscopic guidance, the skin and soft tissues tissue overlying needle trajectory to the appropriate target sites where the L5 dorsal ramus, L3 and L4 medial branches arise on the right aspect of the lumbar spine were anesthetized with a total of 5 cc of Lidocaine.  16-gauge radiofrequency needles with 10 mm active tips were then advanced under fluoroscopic guidance to the appropriate target sites on the left Side.  Needle tip position in AP and lateral views.  Stimulation at 50 Hz resulted in paresthesia occurrence below 0.5 V at the L3 and L4 medial branches but could not be elicited easily at the L5 posterior ramus until about 1 V.  Motor stimulation at 2 Hz did not result in lower extremity contractions at 1.2 V at all 3 levels.  Thereafter, 1.5 mL of 2% lidocaine was applied at each needle tip radiofrequency lesioning was carried out at 90° for 90 seconds ×2.   An identical procedure was repeated on the left side with a much easier stimulation below 0.5 V for sensory stimulation at all 3 levels.  At the conclusion of the procedure, all needles were  removed.  The patient was transferred to the recovery room in stable condition on will update us on an outpatient basis on his response.

## 2024-05-07 NOTE — H&P
Pain Management H&P    History Of Present Illness  Miguel Tariq is a 68 y.o. male presents for procedure state below. Endorses no changes in past medical history or medical health since last seen in clinic.      Past Medical History  He has a past medical history of Asthma ([facility]), GERD (gastroesophageal reflux disease), HTN (hypertension), Hyperlipidemia, Hypothyroidism, and SVT (supraventricular tachycardia) (CMS-HCC) (04/18/2024).    Surgical History  He has a past surgical history that includes Cardiac electrophysiology mapping and ablation; Tonsillectomy; Temporomandibular joint surgery; Wrist surgery (Right); and Colonoscopy.     Social History  He reports that he has never smoked. He has never used smokeless tobacco. He reports current alcohol use. He reports that he does not use drugs.    Family History  Family History   Problem Relation Name Age of Onset    Other (malignant neoplasm) Mother      Other (cardia disorder) Father      Coronary artery disease Brother  50 - 59        -tob        Allergies  Patient has no known allergies.    Review of Symptoms:   Constitutional: Negative for chills, diaphoresis or fever  HENT: Negative for neck swelling  Eyes:.  Negative for eye pain  Respiratory:.  Negative for cough, shortness of breath or wheezing    Cardiovascular:.  Negative for chest pain or palpitations  Gastrointestinal:.  Negative for abdominal pain, nausea and vomiting  Genitourinary:.  Negative for urgency  Musculoskeletal:  Positive for back pain. Positive for joint pain. Denies falls within the past 3 months.  Skin: Negative for wounds or itching   Neurological: Negative for dizziness, seizures, loss of consciousness and weakness  Endo/Heme/Allergies: Does not bruise/bleed easily  Psychiatric/Behavioral: Negative for depression. The patient does not appear anxious.       PHYSICAL EXAM  Vitals signs reviewed  Constitutional:       General: Not in acute distress     Appearance: Normal  appearance. Not ill-appearing.  HENT:     Head: Normocephalic and atraumatic  Eyes:     Conjunctiva/sclera: Conjunctivae normal  Cardiovascular:     Rate and Rhythm: Normal rate and regular rhythm  Pulmonary:     Effort: No respiratory distress  Abdominal:     Palpations: Abdomen is soft  Musculoskeletal: ALVARADO  Skin:     General: Skin is warm and dry  Neurological:     General: No focal deficit present  Psychiatric:         Mood and Affect: Mood normal         Behavior: Behavior normal     Last Recorded Vitals  /70   Pulse 60   Temp 36.3 °C (97.4 °F) (Skin)   Resp 16   Wt 86.2 kg (190 lb)   SpO2 97%     Relevant Results  Current Outpatient Medications   Medication Instructions    amLODIPine (NORVASC) 5 mg, oral, Daily    atorvastatin (LIPITOR) 10 mg, oral, Daily    Bacillus coagulans (PROBIOTIC, B. COAGULANS, ORAL) oral    budesonide-formoteroL (Symbicort) 80-4.5 mcg/actuation inhaler 2 puffs, inhalation, 2 times daily, Rinse mouth after use.    GaviLyte-G 236-22.74-6.74 -5.86 gram solution     glucos sul 2KCl-msm-chond-C-Mn (Glucosamine Chondroitin) 550-30-1 mg capsule oral    levothyroxine (Synthroid, Levoxyl) 112 mcg tablet TAKE 1 TABLET BY MOUTH DAILY    losartan (Cozaar) 50 mg tablet TAKE 1 TABLET BY MOUTH DAILY AS  DIRECTED    methocarbamol (ROBAXIN) 750 mg, oral, Daily, PRN    multivitamin tablet oral    pantoprazole (PROTONIX) 40 mg, oral, Daily, DO NOT CRUSH CHEW OR SPLIT         MR lumbar spine wo IV contrast 02/02/2024    Narrative  Interpreted By:  Chastity Gama,  and Noe Owens  STUDY:  MR LUMBAR SPINE WO IV CONTRAST;  2/2/2024 10:25 am    INDICATION:  Signs/Symptoms:lumbar pain.    COMPARISON:  None.  Lumbar x-ray 01/24/2024    ACCESSION NUMBER(S):  [id number]    ORDERING CLINICIAN:  RAJWINDER MORIN    TECHNIQUE:  Sagittal T1, T2, STIR, axial T1 and T2 weighted images of the lumbar  spine were acquired.    FINDINGS:  The last lumbar vertebral body is labeled L5.    Alignment: There is trace  retrolisthesis of L1 on L2. There is grade  1 anterolisthesis of L4 on L5. Alignment is otherwise maintained.    Vertebrae/Intervertebral Discs: The vertebral bodies demonstrate  expected height. There is a focus T2 and T1 hyperintensity with loss  of signal on STIR imaging at L1 vertebral body suggestive of  hemangioma. The marrow signal is within normal limits. There is  multilevel disc desiccation and mild disc height loss at L4-L5 and  L5-S1.    Conus: The lower thoracic cord appears unremarkable. The conus  terminates at L1.    L5-S1: Disc bulge with a superimposed small central disc protrusion  and facet arthrosis. No spinal canal stenosis. Mild to moderate  neural foraminal stenosis.    L4-5: There is anterolisthesis of L4 on L5, disc bulge, ligamentum  flavum hypertrophy, and bilateral facet arthrosis causing mild  central canal stenosis. There is mild bilateral neural foraminal  narrowing.    L3-4: Disc bulge and facet arthrosis without significant central  canal stenosis. There is mild neural foraminal stenosis.    L2-3: There is ligamentum flavum hypertrophy and mild facet arthrosis  without significant central canal stenosis or neural foraminal  stenosis.    L1-2: There is retrolisthesis of L1 on L2, disc bulge and mild facet  arthrosis without significant central canal stenosis. Moderate  bilateral neural foraminal stenosis.    T12-L1: No posterior disc contour abnormality. There is no  significant central canal or neural foraminal stenosis.    The paraspinous soft tissues are unremarkable. Incompletely evaluated  subcentimeter T2 hyperintense lesion within the left kidney likely  represents a cyst.    Impression  Multilevel degenerative disc disease and facet arthrosis without  significant spinal canal stenosis. Mild-to-moderate neural foraminal  narrowing as detailed above.    I personally reviewed the images/study and I agree with Dr. Roman Liu findings as stated. This study was interpreted at  [place], [place]    MACRO:  None    Signed by: Chastity Gama 2/2/2024 2:02 PM  Dictation workstation:   [id number]     No image results found.       1. Acute right-sided low back pain with sciatica, sciatica laterality unspecified  Radiofrequency Ablation    Radiofrequency Ablation    FL pain management    FL pain management           ASSESSMENT/PLAN  Miguel Tariq is a 68 y.o. male presents for bilateral lumbar facet radiofrequency ablations.     Patient denies any recent antibiotic use or infections, denies any blood thinner use, and denies contrast or local anesthetic allergies     Risks, benefits, alternatives discussed. All questions answered to the best of my ability. Patient agrees to proceed.      Our plan is as follows:  - Proceed with aforementioned procedure          DO HENRIK Ayala Pain fellow

## 2024-05-15 ENCOUNTER — TELEPHONE (OUTPATIENT)
Dept: CARDIOLOGY | Facility: CLINIC | Age: 69
End: 2024-05-15
Payer: MEDICARE

## 2024-05-21 ENCOUNTER — HOSPITAL ENCOUNTER (OUTPATIENT)
Dept: CARDIOLOGY | Facility: CLINIC | Age: 69
Discharge: HOME | End: 2024-05-21
Payer: MEDICARE

## 2024-05-21 VITALS
BODY MASS INDEX: 28.14 KG/M2 | HEART RATE: 65 BPM | DIASTOLIC BLOOD PRESSURE: 77 MMHG | WEIGHT: 190 LBS | HEIGHT: 69 IN | SYSTOLIC BLOOD PRESSURE: 132 MMHG

## 2024-05-21 DIAGNOSIS — I10 BENIGN HYPERTENSION: ICD-10-CM

## 2024-05-21 DIAGNOSIS — I47.10 SVT (SUPRAVENTRICULAR TACHYCARDIA) (CMS-HCC): ICD-10-CM

## 2024-05-21 DIAGNOSIS — R07.89 CHEST DISCOMFORT: ICD-10-CM

## 2024-05-21 DIAGNOSIS — R07.9 CHEST PAIN, UNSPECIFIED: ICD-10-CM

## 2024-05-21 DIAGNOSIS — E78.2 MIXED HYPERLIPIDEMIA: ICD-10-CM

## 2024-05-21 PROCEDURE — 93018 CV STRESS TEST I&R ONLY: CPT | Performed by: INTERNAL MEDICINE

## 2024-05-21 PROCEDURE — 93017 CV STRESS TEST TRACING ONLY: CPT

## 2024-05-21 PROCEDURE — 93016 CV STRESS TEST SUPVJ ONLY: CPT | Performed by: INTERNAL MEDICINE

## 2024-05-21 PROCEDURE — 93350 STRESS TTE ONLY: CPT | Performed by: INTERNAL MEDICINE

## 2024-05-28 ENCOUNTER — OFFICE VISIT (OUTPATIENT)
Dept: GASTROENTEROLOGY | Facility: CLINIC | Age: 69
End: 2024-05-28
Payer: MEDICARE

## 2024-05-28 VITALS
BODY MASS INDEX: 27.85 KG/M2 | TEMPERATURE: 97.9 F | HEIGHT: 69 IN | HEART RATE: 63 BPM | SYSTOLIC BLOOD PRESSURE: 124 MMHG | RESPIRATION RATE: 20 BRPM | DIASTOLIC BLOOD PRESSURE: 67 MMHG | WEIGHT: 188 LBS

## 2024-05-28 DIAGNOSIS — D64.9 ANEMIA, UNSPECIFIED TYPE: ICD-10-CM

## 2024-05-28 DIAGNOSIS — D50.9 IRON DEFICIENCY ANEMIA, UNSPECIFIED IRON DEFICIENCY ANEMIA TYPE: Primary | ICD-10-CM

## 2024-05-28 DIAGNOSIS — K21.9 GASTROESOPHAGEAL REFLUX DISEASE, UNSPECIFIED WHETHER ESOPHAGITIS PRESENT: ICD-10-CM

## 2024-05-28 PROCEDURE — 1159F MED LIST DOCD IN RCRD: CPT | Performed by: INTERNAL MEDICINE

## 2024-05-28 PROCEDURE — 3074F SYST BP LT 130 MM HG: CPT | Performed by: INTERNAL MEDICINE

## 2024-05-28 PROCEDURE — 1160F RVW MEDS BY RX/DR IN RCRD: CPT | Performed by: INTERNAL MEDICINE

## 2024-05-28 PROCEDURE — 3078F DIAST BP <80 MM HG: CPT | Performed by: INTERNAL MEDICINE

## 2024-05-28 PROCEDURE — 99213 OFFICE O/P EST LOW 20 MIN: CPT | Performed by: INTERNAL MEDICINE

## 2024-05-28 PROCEDURE — 1125F AMNT PAIN NOTED PAIN PRSNT: CPT | Performed by: INTERNAL MEDICINE

## 2024-05-28 RX ORDER — PANTOPRAZOLE SODIUM 20 MG/1
20 TABLET, DELAYED RELEASE ORAL DAILY
Qty: 90 TABLET | Refills: 3 | Status: SHIPPED | OUTPATIENT
Start: 2024-05-28 | End: 2025-05-28

## 2024-05-28 RX ORDER — ACETAMINOPHEN 500 MG
5 TABLET ORAL NIGHTLY PRN
COMMUNITY

## 2024-05-28 ASSESSMENT — PAIN SCALES - GENERAL: PAINLEVEL: 5

## 2024-05-28 ASSESSMENT — ENCOUNTER SYMPTOMS
GASTROINTESTINAL NEGATIVE: 1
CONSTITUTIONAL NEGATIVE: 1

## 2024-05-28 NOTE — PATIENT INSTRUCTIONS
I recommend you continue the pantoprazole daily.  We will reduce the dose to 20 mg per day.    I recommend a daily dose of iron supplement since you are not getting much in the way of red meat.      We discussed the video capsule endoscopy to be sure there is no significant reason to loose blood in your small intestine.  If we find small blood vessels called angiodysplasia, I am not sure it is worth trying to treat them for reasons we discussed.

## 2024-05-28 NOTE — PROGRESS NOTES
Subjective   Patient ID: Miguel Tariq is a 68 y.o. male who presents for New Patient Visit.  Here for follow up to last endoscopy and iron deficiency anemia  Seen by Mila Hanks and referred to EGD/colonoscopy  EGD noted erosive esophagitis with 3 cm hiatal hernia; colonoscopy noted on diverticular disease   Taking Protonix 40 mg per day now and GERD symptoms are gone    Diet - wife cooks very healthy and very little red meat  No obstructive symptoms  Does not see blood in the stool               Review of Systems   Constitutional: Negative.    Gastrointestinal: Negative.        Objective   Physical Exam  Constitutional:       Appearance: Normal appearance. He is normal weight.   Neurological:      Mental Status: He is alert.         Assessment/Plan   Problem List Items Addressed This Visit             ICD-10-CM    Gastroesophageal reflux disease K21.9    Anemia D64.9     No source on EGD and colonoscopy so we will move forward to VCE  Iron supplement and reduce PPI to minimum dose to control symptoms of erosive esophagitis          Jairon Julian, DO 05/28/24 2:24 PM

## 2024-06-18 ENCOUNTER — OFFICE VISIT (OUTPATIENT)
Dept: PAIN MEDICINE | Facility: HOSPITAL | Age: 69
End: 2024-06-18
Payer: MEDICARE

## 2024-06-18 DIAGNOSIS — M48.061 DEGENERATIVE LUMBAR SPINAL STENOSIS: ICD-10-CM

## 2024-06-18 DIAGNOSIS — M51.36 DDD (DEGENERATIVE DISC DISEASE), LUMBAR: Primary | ICD-10-CM

## 2024-06-18 DIAGNOSIS — M54.40 ACUTE BILATERAL LOW BACK PAIN WITH SCIATICA, SCIATICA LATERALITY UNSPECIFIED: ICD-10-CM

## 2024-06-18 PROCEDURE — 99214 OFFICE O/P EST MOD 30 MIN: CPT | Performed by: ANESTHESIOLOGY

## 2024-06-18 PROCEDURE — 1125F AMNT PAIN NOTED PAIN PRSNT: CPT | Performed by: ANESTHESIOLOGY

## 2024-06-18 ASSESSMENT — PAIN SCALES - GENERAL: PAINLEVEL: 7

## 2024-06-18 NOTE — PROGRESS NOTES
Chief Complaint   Patient presents with    Back Pain     Subjective   Patient ID: Miguel Tariq is a 68 y.o. male with a past medical history of hypertension, hyperlipidemia, asthma, SVT who was previously a patient of Dr. Hurtado who is seen in our clinic as a new patient for back pain.      HPI:   He reports having axial back pain for around 1 year that started when he was golfing.  He denies any trauma or other inciting event.  His back pain is intermittent and severe in character and is made worse with transitional movements such as repositioning in bed and getting in and out of cars.      He recently underwent diagnostic medial branch blocks and reported 80% pain relief with both diagnostic blocks.  He underwent a lumbar medial branch radiofrequency neurotomy 5/7/2024 reported somewhat decreased frequency of his pains, but nothing dramatic.  He feels that the sensory stimulation during the ablation was in the region of his pain.    He is a very active person and plays pickle ball and golfs and cycles and does exercises 2-3 times per week versus daily.    Physical Therapy: The patient completed more than six weeks of formal physical therapy more than six months ago, but has done physician-directed exercises at home for 2-3 times per week for greater than six weeks with minimal improvement  Other Conservative Measures he has tried: Heating Pad, Ice, and Injections  Classes of medications tried in the past: Acetaminophen, NSAIDs, and Topical agents    Review of Systems   13-point ROS done and negative except for HPI.     Current Outpatient Medications   Medication Instructions    amLODIPine (NORVASC) 5 mg, oral, Daily    atorvastatin (LIPITOR) 10 mg, oral, Daily    Bacillus coagulans (PROBIOTIC, B. COAGULANS, ORAL) oral    budesonide-formoteroL (Symbicort) 80-4.5 mcg/actuation inhaler 2 puffs, inhalation, 2 times daily, Rinse mouth after use.    glucos sul 2KCl-msm-chond-C-Mn (Glucosamine Chondroitin) 550-30-1  mg capsule oral    levothyroxine (Synthroid, Levoxyl) 112 mcg tablet TAKE 1 TABLET BY MOUTH DAILY    losartan (Cozaar) 50 mg tablet TAKE 1 TABLET BY MOUTH DAILY AS  DIRECTED    melatonin 5 mg, oral, Nightly PRN    methocarbamol (ROBAXIN) 750 mg, oral, Daily, PRN    multivitamin tablet oral    pantoprazole (PROTONIX) 20 mg, oral, Daily, DO NOT CRUSH CHEW OR SPLIT       Past Medical History:   Diagnosis Date    Asthma ([facility])     GERD (gastroesophageal reflux disease)     HTN (hypertension)     Hyperlipidemia     Hypothyroidism     SVT (supraventricular tachycardia) (CMS-HCC) 04/18/2024    S/p RFA 2013        Past Surgical History:   Procedure Laterality Date    CARDIAC ELECTROPHYSIOLOGY MAPPING AND ABLATION      COLONOSCOPY      TEMPOROMANDIBULAR JOINT SURGERY      arthroscopy    TONSILLECTOMY      WRIST SURGERY Right         Family History   Problem Relation Name Age of Onset    Other (malignant neoplasm) Mother      Other (cardia disorder) Father      Coronary artery disease Brother  50 - 59        -tob        No Known Allergies     Objective     There were no vitals filed for this visit.     Physical Exam  General: NAD, well groomed, well nourished  Eyes: Non-icteric sclera, EOMI  Ears, Nose, Mouth, and Throat: External ears and nose appear to be without deformity or rash. No lesions or masses noted. Hearing is grossly intact.   Neck: Trachea midline  Respiratory: Nonlabored breathing   Cardiovascular: no peripheral edema   Skin: No rashes or open lesions/ulcers identified on skin.    Back:   Palpation: No tenderness to palpation over lumbar paraspinous muscles.   Straight leg raise: positive at 40 degrees on the left  THOM Maneuver does reproduce pain on the left    Hip: No pain over greater trochanters. and Pain not reproduced with hip internal/external rotation.     Neurologic:   Cranial nerves grossly intact.   Strength 5/5 and symmetric plantar/dorsiflexion   Sensation: Normal to light touch  throughout  DTRs: Right patella 3+, left patella 2+, 2+ and symmetric bilaterally at biceps and brachioradialis  Donato: absent  Clonus: absent    Psychiatric: Alert, orientation to person, place, and time. Cooperative.    Imaging personally reviewed and independently interpreted: Lumbar MRI from February 2024 showed grade 1 anterolisthesis of L4 on L5, no significant central canal stenosis, mild bilateral foraminal and subarticular recess stenosis due to disc herniation at that level.  Lumbar flexion-extension films show no change in listhesis.    Assessment/Plan   Vigorous 68-year-old male with axial back pain due to lumbar spinal stenosis from degenerative disc disease at L4 5/ and 5/1 with bilateral L5 foraminal stenosis who had no improvement with bilateral lumbar medial branch RFA done last month.  He is concerned about side effects of medications and is not interested in taking any medications on a long term basis.     Plan:  -Bilateral L5 transforaminal epidural steroid injections    The patient has failed treatment with : Physical therapy , three or more classes of medications, alternative therapies, have significant limitations in their sleep quality due to the pain, have significant limitations of their quality of life due to the pain, have significant impairments of their activities of daily living (ADLs) due to the pain, have significant impairments of their instrumental activities of daily living (IADLs) due to the pain, and the procedure is medically indicated    We discussed  the risks, benefits and alternatives of the procedure including but not limited to: , Paralysis, Epidural hematoma, Post-dural puncture headache, Lack of efficacy , Transiently worsening pain , Bleeding, Infection , Nerve Damage, and The patient or his decision-maker expressed understanding and agreed to proceed. All questions were answered to the best of my ability.     Follow up: After procedure    The patient was invited to  contact us back anytime with any questions or concerns and follow-up with us in the office as needed.     Diagnoses and all orders for this visit:  DDD (degenerative disc disease), lumbar  Degenerative lumbar spinal stenosis      This note was generated with the aid of dictation software, there may be typos despite my attempts at proofreading.

## 2024-06-19 DIAGNOSIS — K21.9 GASTROESOPHAGEAL REFLUX DISEASE, UNSPECIFIED WHETHER ESOPHAGITIS PRESENT: ICD-10-CM

## 2024-06-19 RX ORDER — PANTOPRAZOLE SODIUM 20 MG/1
20 TABLET, DELAYED RELEASE ORAL DAILY
Qty: 90 TABLET | Refills: 3 | Status: SHIPPED | OUTPATIENT
Start: 2024-06-19 | End: 2025-06-19

## 2024-06-20 ENCOUNTER — HOSPITAL ENCOUNTER (OUTPATIENT)
Dept: GASTROENTEROLOGY | Facility: HOSPITAL | Age: 69
Setting detail: OUTPATIENT SURGERY
Discharge: HOME | End: 2024-06-20
Payer: MEDICARE

## 2024-06-20 DIAGNOSIS — D50.9 IRON DEFICIENCY ANEMIA, UNSPECIFIED IRON DEFICIENCY ANEMIA TYPE: ICD-10-CM

## 2024-06-20 PROCEDURE — 91110 GI TRC IMG INTRAL ESOPH-ILE: CPT

## 2024-06-20 PROCEDURE — 2720000007 HC OR 272 NO HCPCS

## 2024-07-03 ENCOUNTER — HOSPITAL ENCOUNTER (OUTPATIENT)
Dept: RADIOLOGY | Facility: HOSPITAL | Age: 69
Discharge: HOME | End: 2024-07-03
Payer: MEDICARE

## 2024-07-03 VITALS
BODY MASS INDEX: 27.85 KG/M2 | RESPIRATION RATE: 16 BRPM | HEIGHT: 69 IN | WEIGHT: 188 LBS | TEMPERATURE: 97.8 F | SYSTOLIC BLOOD PRESSURE: 134 MMHG | OXYGEN SATURATION: 97 % | DIASTOLIC BLOOD PRESSURE: 85 MMHG | HEART RATE: 52 BPM

## 2024-07-03 DIAGNOSIS — M54.40 ACUTE BILATERAL LOW BACK PAIN WITH SCIATICA, SCIATICA LATERALITY UNSPECIFIED: ICD-10-CM

## 2024-07-03 PROCEDURE — 2720000007 HC OR 272 NO HCPCS

## 2024-07-03 PROCEDURE — 64483 NJX AA&/STRD TFRM EPI L/S 1: CPT | Performed by: ANESTHESIOLOGY

## 2024-07-03 PROCEDURE — 64483 NJX AA&/STRD TFRM EPI L/S 1: CPT | Mod: 50 | Performed by: ANESTHESIOLOGY

## 2024-07-03 ASSESSMENT — PAIN - FUNCTIONAL ASSESSMENT
PAIN_FUNCTIONAL_ASSESSMENT: WONG-BAKER FACES
PAIN_FUNCTIONAL_ASSESSMENT: 0-10
PAIN_FUNCTIONAL_ASSESSMENT: WONG-BAKER FACES
PAIN_FUNCTIONAL_ASSESSMENT: 0-10
PAIN_FUNCTIONAL_ASSESSMENT: WONG-BAKER FACES

## 2024-07-03 ASSESSMENT — PAIN SCALES - GENERAL
PAINLEVEL_OUTOF10: 5 - MODERATE PAIN
PAINLEVEL_OUTOF10: 0 - NO PAIN
PAINLEVEL_OUTOF10: 3

## 2024-07-03 NOTE — H&P
Pain Management H&P    History Of Present Illness  Miguel Tariq is a 68 y.o. male presents for procedure state below. Endorses no changes in past medical history or medical health since last seen in clinic.      Past Medical History  He has a past medical history of Asthma ([facility]), GERD (gastroesophageal reflux disease), HTN (hypertension), Hyperlipidemia, Hypothyroidism, and SVT (supraventricular tachycardia) (CMS-HCC) (04/18/2024).    Surgical History  He has a past surgical history that includes Cardiac electrophysiology mapping and ablation; Tonsillectomy; Temporomandibular joint surgery; Wrist surgery (Right); and Colonoscopy.     Social History  He reports that he has never smoked. He has never used smokeless tobacco. He reports current alcohol use. He reports that he does not use drugs.    Family History  Family History   Problem Relation Name Age of Onset    Other (malignant neoplasm) Mother      Other (cardia disorder) Father      Coronary artery disease Brother  50 - 59        -tob        Allergies  Patient has no known allergies.    Review of Symptoms:   Constitutional: Negative for chills, diaphoresis or fever  HENT: Negative for neck swelling  Eyes:.  Negative for eye pain  Respiratory:.  Negative for cough, shortness of breath or wheezing    Cardiovascular:.  Negative for chest pain or palpitations  Gastrointestinal:.  Negative for abdominal pain, nausea and vomiting  Genitourinary:.  Negative for urgency  Musculoskeletal:  Positive for back pain. Positive for joint pain. Denies falls within the past 3 months.  Skin: Negative for wounds or itching   Neurological: Negative for dizziness, seizures, loss of consciousness and weakness  Endo/Heme/Allergies: Does not bruise/bleed easily  Psychiatric/Behavioral: Negative for depression. The patient does not appear anxious.       PHYSICAL EXAM  Vitals signs reviewed  Constitutional:       General: Not in acute distress     Appearance: Normal  appearance. Not ill-appearing.  HENT:     Head: Normocephalic and atraumatic  Eyes:     Conjunctiva/sclera: Conjunctivae normal  Cardiovascular:     Rate and Rhythm: Normal rate and regular rhythm  Pulmonary:     Effort: No respiratory distress  Abdominal:     Palpations: Abdomen is soft  Musculoskeletal: ALVARADO  Skin:     General: Skin is warm and dry  Neurological:     General: No focal deficit present  Psychiatric:         Mood and Affect: Mood normal         Behavior: Behavior normal     Last Recorded Vitals  /77   Pulse 56   Temp 36.5 °C (97.7 °F) (Temporal)   Resp 16   Wt 85.3 kg (188 lb)   SpO2 98%     Relevant Results  Current Outpatient Medications   Medication Instructions    amLODIPine (NORVASC) 5 mg, oral, Daily    atorvastatin (LIPITOR) 10 mg, oral, Daily    Bacillus coagulans (PROBIOTIC, B. COAGULANS, ORAL) oral    budesonide-formoteroL (Symbicort) 80-4.5 mcg/actuation inhaler 2 puffs, inhalation, 2 times daily, Rinse mouth after use.    glucos sul 2KCl-msm-chond-C-Mn (Glucosamine Chondroitin) 550-30-1 mg capsule oral    levothyroxine (Synthroid, Levoxyl) 112 mcg tablet TAKE 1 TABLET BY MOUTH DAILY    losartan (Cozaar) 50 mg tablet TAKE 1 TABLET BY MOUTH DAILY AS  DIRECTED    melatonin 5 mg, oral, Nightly PRN    methocarbamol (ROBAXIN) 750 mg, oral, Daily, PRN    multivitamin tablet oral    pantoprazole (PROTONIX) 20 mg, oral, Daily, DO NOT CRUSH CHEW OR SPLIT         MR lumbar spine wo IV contrast 02/02/2024    Narrative  Interpreted By:  Chastity Gama,  and Noe Owens  STUDY:  MR LUMBAR SPINE WO IV CONTRAST;  2/2/2024 10:25 am    INDICATION:  Signs/Symptoms:lumbar pain.    COMPARISON:  None.  Lumbar x-ray 01/24/2024    ACCESSION NUMBER(S):  [id number]    ORDERING CLINICIAN:  RAJWINDER MORIN    TECHNIQUE:  Sagittal T1, T2, STIR, axial T1 and T2 weighted images of the lumbar  spine were acquired.    FINDINGS:  The last lumbar vertebral body is labeled L5.    Alignment: There is trace  retrolisthesis of L1 on L2. There is grade  1 anterolisthesis of L4 on L5. Alignment is otherwise maintained.    Vertebrae/Intervertebral Discs: The vertebral bodies demonstrate  expected height. There is a focus T2 and T1 hyperintensity with loss  of signal on STIR imaging at L1 vertebral body suggestive of  hemangioma. The marrow signal is within normal limits. There is  multilevel disc desiccation and mild disc height loss at L4-L5 and  L5-S1.    Conus: The lower thoracic cord appears unremarkable. The conus  terminates at L1.    L5-S1: Disc bulge with a superimposed small central disc protrusion  and facet arthrosis. No spinal canal stenosis. Mild to moderate  neural foraminal stenosis.    L4-5: There is anterolisthesis of L4 on L5, disc bulge, ligamentum  flavum hypertrophy, and bilateral facet arthrosis causing mild  central canal stenosis. There is mild bilateral neural foraminal  narrowing.    L3-4: Disc bulge and facet arthrosis without significant central  canal stenosis. There is mild neural foraminal stenosis.    L2-3: There is ligamentum flavum hypertrophy and mild facet arthrosis  without significant central canal stenosis or neural foraminal  stenosis.    L1-2: There is retrolisthesis of L1 on L2, disc bulge and mild facet  arthrosis without significant central canal stenosis. Moderate  bilateral neural foraminal stenosis.    T12-L1: No posterior disc contour abnormality. There is no  significant central canal or neural foraminal stenosis.    The paraspinous soft tissues are unremarkable. Incompletely evaluated  subcentimeter T2 hyperintense lesion within the left kidney likely  represents a cyst.    Impression  Multilevel degenerative disc disease and facet arthrosis without  significant spinal canal stenosis. Mild-to-moderate neural foraminal  narrowing as detailed above.    I personally reviewed the images/study and I agree with Dr. Roman Liu findings as stated. This study was interpreted at  [place], [place]    MACRO:  None    Signed by: Chastity Gama 2/2/2024 2:02 PM  Dictation workstation:   [id number]     No image results found.       1. Acute bilateral low back pain with sciatica, sciatica laterality unspecified  Transforaminal    Transforaminal    FL pain management    FL pain management           ASSESSMENT/PLAN  Miguel Tariq is a 68 y.o. male presents for Bilateral L5 transforaminal epidural steroid injections     Patient denies any recent antibiotic use or infections, denies any blood thinner use, and denies contrast or local anesthetic allergies     Risks, benefits, alternatives discussed. All questions answered to the best of my ability. Patient agrees to proceed.      Our plan is as follows:  - Proceed with aforementioned procedure        Angelina Suarez DO   Pain fellow

## 2024-07-15 DIAGNOSIS — M54.16 LUMBAR RADICULOPATHY: ICD-10-CM

## 2024-08-05 ENCOUNTER — APPOINTMENT (OUTPATIENT)
Facility: HOSPITAL | Age: 69
End: 2024-08-05
Payer: MEDICARE

## 2024-08-05 VITALS
TEMPERATURE: 98.2 F | HEIGHT: 69 IN | OXYGEN SATURATION: 100 % | BODY MASS INDEX: 27.85 KG/M2 | WEIGHT: 188 LBS | SYSTOLIC BLOOD PRESSURE: 143 MMHG | RESPIRATION RATE: 18 BRPM | HEART RATE: 64 BPM | DIASTOLIC BLOOD PRESSURE: 81 MMHG

## 2024-08-05 DIAGNOSIS — M54.16 LUMBAR RADICULOPATHY: ICD-10-CM

## 2024-08-05 PROCEDURE — 64483 NJX AA&/STRD TFRM EPI L/S 1: CPT | Performed by: ANESTHESIOLOGY

## 2024-08-05 RX ORDER — LIDOCAINE HYDROCHLORIDE 5 MG/ML
INJECTION, SOLUTION INFILTRATION; INTRAVENOUS
Status: DISCONTINUED
Start: 2024-08-05 | End: 2024-08-06 | Stop reason: HOSPADM

## 2024-08-05 RX ORDER — METHYLPREDNISOLONE ACETATE 40 MG/ML
INJECTION, SUSPENSION INTRA-ARTICULAR; INTRALESIONAL; INTRAMUSCULAR; SOFT TISSUE
Status: DISCONTINUED
Start: 2024-08-05 | End: 2024-08-06 | Stop reason: HOSPADM

## 2024-08-05 RX ORDER — DEXAMETHASONE SODIUM PHOSPHATE 10 MG/ML
INJECTION INTRAMUSCULAR; INTRAVENOUS
Status: DISCONTINUED
Start: 2024-08-05 | End: 2024-08-05 | Stop reason: WASHOUT

## 2024-08-05 ASSESSMENT — PAIN SCALES - GENERAL
PAINLEVEL_OUTOF10: 2
PAINLEVEL_OUTOF10: 0 - NO PAIN
PAINLEVEL_OUTOF10: 2
PAINLEVEL_OUTOF10: 2

## 2024-08-05 ASSESSMENT — PAIN - FUNCTIONAL ASSESSMENT
PAIN_FUNCTIONAL_ASSESSMENT: 0-10

## 2024-08-05 ASSESSMENT — COLUMBIA-SUICIDE SEVERITY RATING SCALE - C-SSRS
2. HAVE YOU ACTUALLY HAD ANY THOUGHTS OF KILLING YOURSELF?: NO
6. HAVE YOU EVER DONE ANYTHING, STARTED TO DO ANYTHING, OR PREPARED TO DO ANYTHING TO END YOUR LIFE?: NO
1. IN THE PAST MONTH, HAVE YOU WISHED YOU WERE DEAD OR WISHED YOU COULD GO TO SLEEP AND NOT WAKE UP?: NO

## 2024-08-05 NOTE — H&P
HISTORY AND PHYSICAL    History Of Present Illness  Miguel Tariq is a 68 y.o. male presenting with chronic low back pain here for Bilateral lumbar transforaminal epidural steroid injections; he denies any recent antibiotic use or infections, he denies any blood thinner use, he denies contrast or local anesthetic allergies, and he has no known medical allergies    Past Medical History  Past Medical History:   Diagnosis Date    Asthma ([facility])     GERD (gastroesophageal reflux disease)     HTN (hypertension)     Hyperlipidemia     Hypothyroidism     SVT (supraventricular tachycardia) (CMS-HCC) 04/18/2024    S/p RFA 2013       Surgical History  Past Surgical History:   Procedure Laterality Date    CARDIAC ELECTROPHYSIOLOGY MAPPING AND ABLATION      COLONOSCOPY      TEMPOROMANDIBULAR JOINT SURGERY      arthroscopy    TONSILLECTOMY      WRIST SURGERY Right         Social History  He reports that he has never smoked. He has never used smokeless tobacco. He reports current alcohol use. He reports that he does not use drugs.    Family History  Family History   Problem Relation Name Age of Onset    Other (malignant neoplasm) Mother      Other (cardia disorder) Father      Coronary artery disease Brother  50 - 59        -tob        Allergies  Patient has no known allergies.    Review of Systems   12 point ROS done and negative except for the above.   Physical Exam     General: NAD, well groomed, well nourished  Eyes: Non-icteric sclera, EOMI  Ears, Nose, Mouth, and Throat: External ears and nose appear to be without deformity or rash. No lesions or masses noted. Hearing is grossly intact.   Neck: Trachea midline  Respiratory: Nonlabored breathing   Cardiovascular: No peripheral edema   Skin: No rashes or open lesions/ulcers identified on skin.    Last Recorded Vitals  There were no vitals taken for this visit.    Relevant Results           Assessment/Plan       Risks, benefits, alternatives discussed. All questions  answered to the best of my ability. Patient agrees to proceed.   -We will proceed with planned procedure        Violet Low MD  Chronic Pain Fellow  [facility]

## 2024-08-09 DIAGNOSIS — D64.9 ANEMIA, UNSPECIFIED TYPE: Primary | ICD-10-CM

## 2024-09-13 DIAGNOSIS — K21.00 GASTROESOPHAGEAL REFLUX DISEASE WITH ESOPHAGITIS, UNSPECIFIED WHETHER HEMORRHAGE: ICD-10-CM

## 2024-09-13 RX ORDER — PANTOPRAZOLE SODIUM 40 MG/1
40 TABLET, DELAYED RELEASE ORAL DAILY
Qty: 90 TABLET | Refills: 3 | Status: SHIPPED | OUTPATIENT
Start: 2024-09-13 | End: 2025-09-13

## 2024-09-20 ENCOUNTER — APPOINTMENT (OUTPATIENT)
Dept: PRIMARY CARE | Facility: CLINIC | Age: 69
End: 2024-09-20
Payer: MEDICARE

## 2024-10-30 ENCOUNTER — APPOINTMENT (OUTPATIENT)
Dept: DERMATOLOGY | Facility: CLINIC | Age: 69
End: 2024-10-30
Payer: MEDICARE

## 2024-10-30 DIAGNOSIS — L57.8 DIFFUSE PHOTODAMAGE OF SKIN: ICD-10-CM

## 2024-10-30 DIAGNOSIS — D22.5 MELANOCYTIC NEVUS OF TRUNK: ICD-10-CM

## 2024-10-30 DIAGNOSIS — Z86.018 HISTORY OF DYSPLASTIC NEVUS: ICD-10-CM

## 2024-10-30 DIAGNOSIS — D48.5 NEOPLASM OF UNCERTAIN BEHAVIOR OF SKIN: Primary | ICD-10-CM

## 2024-10-30 DIAGNOSIS — L82.1 SEBORRHEIC KERATOSIS: ICD-10-CM

## 2024-10-30 DIAGNOSIS — L57.0 ACTINIC KERATOSIS: ICD-10-CM

## 2024-10-30 DIAGNOSIS — L21.9 SEBORRHEIC DERMATITIS: ICD-10-CM

## 2024-10-30 DIAGNOSIS — L30.9 DERMATITIS: ICD-10-CM

## 2024-10-30 DIAGNOSIS — L81.4 LENTIGO: ICD-10-CM

## 2024-10-30 PROCEDURE — 1159F MED LIST DOCD IN RCRD: CPT | Performed by: DERMATOLOGY

## 2024-10-30 PROCEDURE — 99214 OFFICE O/P EST MOD 30 MIN: CPT | Performed by: DERMATOLOGY

## 2024-10-30 PROCEDURE — 11301 SHAVE SKIN LESION 0.6-1.0 CM: CPT | Performed by: DERMATOLOGY

## 2024-10-30 PROCEDURE — 17004 DESTROY PREMAL LESIONS 15/>: CPT | Performed by: DERMATOLOGY

## 2024-10-30 RX ORDER — FLUOROURACIL 50 MG/G
CREAM TOPICAL
Qty: 40 G | Refills: 2 | Status: SHIPPED | OUTPATIENT
Start: 2024-10-30

## 2024-10-30 RX ORDER — KETOCONAZOLE 20 MG/G
CREAM TOPICAL
Qty: 60 G | Refills: 11 | Status: SHIPPED | OUTPATIENT
Start: 2024-10-30

## 2024-10-30 RX ORDER — TRIAMCINOLONE ACETONIDE 0.25 MG/G
CREAM TOPICAL
Qty: 80 G | Refills: 0 | Status: SHIPPED | OUTPATIENT
Start: 2024-10-30

## 2024-10-30 ASSESSMENT — DERMATOLOGY QUALITY OF LIFE (QOL) ASSESSMENT
RATE HOW BOTHERED YOU ARE BY SYMPTOMS OF YOUR SKIN PROBLEM (EG, ITCHING, STINGING BURNING, HURTING OR SKIN IRRITATION): 0 - NEVER BOTHERED
RATE HOW EMOTIONALLY BOTHERED YOU ARE BY YOUR SKIN PROBLEM (FOR EXAMPLE, WORRY, EMBARRASSMENT, FRUSTRATION): 0 - NEVER BOTHERED
WHAT SINGLE SKIN CONDITION LISTED BELOW IS THE PATIENT ANSWERING THE QUALITY-OF-LIFE ASSESSMENT QUESTIONS ABOUT: NONE OF THE ABOVE
RATE HOW BOTHERED YOU ARE BY EFFECTS OF YOUR SKIN PROBLEMS ON YOUR ACTIVITIES (EG, GOING OUT, ACCOMPLISHING WHAT YOU WANT, WORK ACTIVITIES OR YOUR RELATIONSHIPS WITH OTHERS): 0 - NEVER BOTHERED
RATE HOW BOTHERED YOU ARE BY EFFECTS OF YOUR SKIN PROBLEMS ON YOUR ACTIVITIES (EG, GOING OUT, ACCOMPLISHING WHAT YOU WANT, WORK ACTIVITIES OR YOUR RELATIONSHIPS WITH OTHERS): 0 - NEVER BOTHERED
RATE HOW EMOTIONALLY BOTHERED YOU ARE BY YOUR SKIN PROBLEM (FOR EXAMPLE, WORRY, EMBARRASSMENT, FRUSTRATION): 0 - NEVER BOTHERED
WHAT SINGLE SKIN CONDITION LISTED BELOW IS THE PATIENT ANSWERING THE QUALITY-OF-LIFE ASSESSMENT QUESTIONS ABOUT: NONE OF THE ABOVE
RATE HOW BOTHERED YOU ARE BY SYMPTOMS OF YOUR SKIN PROBLEM (EG, ITCHING, STINGING BURNING, HURTING OR SKIN IRRITATION): 0 - NEVER BOTHERED

## 2024-10-30 ASSESSMENT — PATIENT GLOBAL ASSESSMENT (PGA): WHAT IS THE PGA: PATIENT GLOBAL ASSESSMENT:  1 - CLEAR

## 2024-11-12 DIAGNOSIS — I10 BENIGN HYPERTENSION: ICD-10-CM

## 2024-11-12 RX ORDER — AMLODIPINE BESYLATE 5 MG/1
5 TABLET ORAL DAILY
Qty: 100 TABLET | Refills: 2 | Status: SHIPPED | OUTPATIENT
Start: 2024-11-12

## 2024-11-19 ENCOUNTER — LAB (OUTPATIENT)
Dept: LAB | Facility: LAB | Age: 69
End: 2024-11-19
Payer: MEDICARE

## 2024-11-19 DIAGNOSIS — D64.9 ANEMIA, UNSPECIFIED TYPE: ICD-10-CM

## 2024-11-19 LAB
ERYTHROCYTE [DISTWIDTH] IN BLOOD BY AUTOMATED COUNT: 14.6 % (ref 11.5–14.5)
FERRITIN SERPL-MCNC: 42 NG/ML (ref 20–300)
HCT VFR BLD AUTO: 44 % (ref 41–52)
HGB BLD-MCNC: 14.6 G/DL (ref 13.5–17.5)
IRON SATN MFR SERPL: 30 % (ref 25–45)
IRON SERPL-MCNC: 107 UG/DL (ref 35–150)
MCH RBC QN AUTO: 29.3 PG (ref 26–34)
MCHC RBC AUTO-ENTMCNC: 33.2 G/DL (ref 32–36)
MCV RBC AUTO: 88 FL (ref 80–100)
NRBC BLD-RTO: 0 /100 WBCS (ref 0–0)
PLATELET # BLD AUTO: 205 X10*3/UL (ref 150–450)
RBC # BLD AUTO: 4.99 X10*6/UL (ref 4.5–5.9)
TIBC SERPL-MCNC: 351 UG/DL (ref 240–445)
UIBC SERPL-MCNC: 244 UG/DL (ref 110–370)
WBC # BLD AUTO: 5.1 X10*3/UL (ref 4.4–11.3)

## 2024-11-19 PROCEDURE — 36415 COLL VENOUS BLD VENIPUNCTURE: CPT

## 2024-11-19 PROCEDURE — 82728 ASSAY OF FERRITIN: CPT

## 2024-11-19 PROCEDURE — 83540 ASSAY OF IRON: CPT

## 2024-11-19 PROCEDURE — 83550 IRON BINDING TEST: CPT

## 2024-11-19 PROCEDURE — 85027 COMPLETE CBC AUTOMATED: CPT

## 2024-11-21 ENCOUNTER — APPOINTMENT (OUTPATIENT)
Dept: PRIMARY CARE | Facility: CLINIC | Age: 69
End: 2024-11-21
Payer: MEDICARE

## 2024-11-21 VITALS
SYSTOLIC BLOOD PRESSURE: 126 MMHG | WEIGHT: 190 LBS | BODY MASS INDEX: 28.79 KG/M2 | DIASTOLIC BLOOD PRESSURE: 80 MMHG | HEIGHT: 68 IN

## 2024-11-21 DIAGNOSIS — Z00.00 WELL ADULT EXAM: ICD-10-CM

## 2024-11-21 DIAGNOSIS — Z00.00 REGULAR CHECK-UP: Primary | ICD-10-CM

## 2024-11-21 DIAGNOSIS — D64.9 ANEMIA, UNSPECIFIED TYPE: ICD-10-CM

## 2024-11-21 PROCEDURE — 1124F ACP DISCUSS-NO DSCNMKR DOCD: CPT | Performed by: INTERNAL MEDICINE

## 2024-11-21 PROCEDURE — 1159F MED LIST DOCD IN RCRD: CPT | Performed by: INTERNAL MEDICINE

## 2024-11-21 PROCEDURE — 3079F DIAST BP 80-89 MM HG: CPT | Performed by: INTERNAL MEDICINE

## 2024-11-21 PROCEDURE — G0439 PPPS, SUBSEQ VISIT: HCPCS | Performed by: INTERNAL MEDICINE

## 2024-11-21 PROCEDURE — 99397 PER PM REEVAL EST PAT 65+ YR: CPT | Performed by: INTERNAL MEDICINE

## 2024-11-21 PROCEDURE — 1170F FXNL STATUS ASSESSED: CPT | Performed by: INTERNAL MEDICINE

## 2024-11-21 PROCEDURE — 3074F SYST BP LT 130 MM HG: CPT | Performed by: INTERNAL MEDICINE

## 2024-11-21 PROCEDURE — 1036F TOBACCO NON-USER: CPT | Performed by: INTERNAL MEDICINE

## 2024-11-21 PROCEDURE — 1160F RVW MEDS BY RX/DR IN RCRD: CPT | Performed by: INTERNAL MEDICINE

## 2024-11-21 PROCEDURE — 3008F BODY MASS INDEX DOCD: CPT | Performed by: INTERNAL MEDICINE

## 2024-11-21 ASSESSMENT — ACTIVITIES OF DAILY LIVING (ADL)
BATHING: INDEPENDENT
MANAGING_FINANCES: INDEPENDENT
DOING_HOUSEWORK: INDEPENDENT
GROCERY_SHOPPING: INDEPENDENT
DRESSING: INDEPENDENT
TAKING_MEDICATION: INDEPENDENT

## 2024-11-21 ASSESSMENT — PATIENT HEALTH QUESTIONNAIRE - PHQ9
1. LITTLE INTEREST OR PLEASURE IN DOING THINGS: NOT AT ALL
2. FEELING DOWN, DEPRESSED OR HOPELESS: NOT AT ALL
SUM OF ALL RESPONSES TO PHQ9 QUESTIONS 1 AND 2: 0
1. LITTLE INTEREST OR PLEASURE IN DOING THINGS: NOT AT ALL
2. FEELING DOWN, DEPRESSED OR HOPELESS: NOT AT ALL
SUM OF ALL RESPONSES TO PHQ9 QUESTIONS 1 AND 2: 0

## 2024-11-21 NOTE — PROGRESS NOTES
"Subjective   Reason for Visit: Miguel Tariq is an 69 y.o. male here for a Medicare Wellness visit.     Past Medical, Surgical, and Family History reviewed and updated in chart.    Reviewed all medications by prescribing practitioner or clinical pharmacist (such as prescriptions, OTCs, herbal therapies and supplements) and documented in the medical record.    HPI    Overall well   Diet good.  Some reduction in sugar/carbs  +exercise (w/o difficulty).     Patient Care Team:  Mary Ellen Arroyo MD as PCP - General  Mary Ellen Arroyo MD as PCP - [employer] PCP     Review of Systems    Objective   Vitals:  /80   Ht 1.721 m (5' 7.75\")   Wt 86.2 kg (190 lb)   BMI 29.10 kg/m²       Physical Exam  Constitutional:       General: He is not in acute distress.     Appearance: Normal appearance. He is not ill-appearing.   HENT:      Head: Normocephalic and atraumatic.      Right Ear: Tympanic membrane and ear canal normal.      Left Ear: Tympanic membrane and ear canal normal.      Nose: Nose normal.      Mouth/Throat:      Mouth: Mucous membranes are moist.      Pharynx: Oropharynx is clear.   Eyes:      General: No scleral icterus.     Extraocular Movements: Extraocular movements intact.      Conjunctiva/sclera: Conjunctivae normal.      Pupils: Pupils are equal, round, and reactive to light.   Cardiovascular:      Rate and Rhythm: Normal rate and regular rhythm.      Pulses: Normal pulses.      Heart sounds: No murmur heard.     No friction rub.   Pulmonary:      Effort: Pulmonary effort is normal.      Breath sounds: Normal breath sounds. No rhonchi or rales.   Abdominal:      General: Abdomen is flat. Bowel sounds are normal. There is no distension.      Palpations: Abdomen is soft. There is no mass.      Tenderness: There is no abdominal tenderness.   Musculoskeletal:      Cervical back: Normal range of motion and neck supple.      Right lower leg: No edema.      Left lower leg: No edema.   Skin:     " General: Skin is warm.   Neurological:      General: No focal deficit present.      Mental Status: He is alert and oriented to person, place, and time.      Cranial Nerves: No cranial nerve deficit.   Psychiatric:         Mood and Affect: Mood normal.         Behavior: Behavior normal.         Judgment: Judgment normal.             Lab Results   Component Value Date    WBC 5.1 11/19/2024    HGB 14.6 11/19/2024    HCT 44.0 11/19/2024     11/19/2024    CHOL 167 09/22/2023    TRIG 58 09/22/2023    HDL 62.2 09/22/2023    ALT 13 09/22/2023    AST 18 08/05/2019     09/22/2023    K 4.5 09/22/2023     (H) 09/22/2023    CREATININE 1.14 09/22/2023    BUN 14 09/22/2023    CO2 27 09/22/2023    TSH 2.28 01/31/2024    PSA 1.35 09/22/2023    HGBA1C 5.8 (A) 09/22/2023       Assessment & Plan  Regular check-up                   #1 hypertension- stable  #2 history of SVT-status post ablation 2013. No issue since. No recent palpitations. f/u cards  #3 hypothyroidism-on treatment for several years. Quite stable. Energy good. No heat/cold intolerance. No skin changes. Bowel stable. labs  #4 hyperlipidemia- will change to lipitor 10 given strong fhx  #5 asthma-using Symbicort once a day only with good control. Primarily exercise induced only. Very rare Ventolin use otherwise. No nighttime awakenings or shortness of breath wanted. f/u pulm  #6 AM urinary freq- stable  #7 increased PSA- normalized. Follow-up with urology PRN  #8 ifbs-spent significant time reviewing.  Reviewed low sugar/carbohydrate diet daily exercise weight loss.  Consideration of metformin, declines now.  Recheck    #9 mild anemia-   #10 CACS -  >1,<100.  Reviewed.  Continue to focus on lifestyle.  Continue statin.  Continue to target LDL less than 100.  retest  #11 GERD-  f/u  GI PRN.  S/p EGD 2024  #12 LBP- continue PT.   Appt pending w/ pain med           +fhx CAD (father/brother)  Reviewed Shingrix   colonoscopy 2016--> due 2026  reviewed risk of  NSAIDS  TDAP- reviewed  COVID booster rec

## 2024-12-06 DIAGNOSIS — K21.00 GASTROESOPHAGEAL REFLUX DISEASE WITH ESOPHAGITIS, UNSPECIFIED WHETHER HEMORRHAGE: ICD-10-CM

## 2024-12-08 RX ORDER — PANTOPRAZOLE SODIUM 40 MG/1
40 TABLET, DELAYED RELEASE ORAL DAILY
Qty: 90 TABLET | Refills: 3 | Status: SHIPPED | OUTPATIENT
Start: 2024-12-08 | End: 2025-12-08

## 2024-12-11 DIAGNOSIS — E78.5 HYPERLIPIDEMIA, UNSPECIFIED HYPERLIPIDEMIA TYPE: ICD-10-CM

## 2024-12-11 RX ORDER — ATORVASTATIN CALCIUM 10 MG/1
10 TABLET, FILM COATED ORAL DAILY
Qty: 100 TABLET | Refills: 2 | Status: SHIPPED | OUTPATIENT
Start: 2024-12-11

## 2025-01-06 ASSESSMENT — DERMATOLOGY QUALITY OF LIFE (QOL) ASSESSMENT
WHAT SINGLE SKIN CONDITION LISTED BELOW IS THE PATIENT ANSWERING THE QUALITY-OF-LIFE ASSESSMENT QUESTIONS ABOUT: NONE OF THE ABOVE
RATE HOW EMOTIONALLY BOTHERED YOU ARE BY YOUR SKIN PROBLEM (FOR EXAMPLE, WORRY, EMBARRASSMENT, FRUSTRATION): 0 - NEVER BOTHERED
RATE HOW BOTHERED YOU ARE BY EFFECTS OF YOUR SKIN PROBLEMS ON YOUR ACTIVITIES (EG, GOING OUT, ACCOMPLISHING WHAT YOU WANT, WORK ACTIVITIES OR YOUR RELATIONSHIPS WITH OTHERS): 0 - NEVER BOTHERED
WHAT SINGLE SKIN CONDITION LISTED BELOW IS THE PATIENT ANSWERING THE QUALITY-OF-LIFE ASSESSMENT QUESTIONS ABOUT: NONE OF THE ABOVE
RATE HOW BOTHERED YOU ARE BY EFFECTS OF YOUR SKIN PROBLEMS ON YOUR ACTIVITIES (EG, GOING OUT, ACCOMPLISHING WHAT YOU WANT, WORK ACTIVITIES OR YOUR RELATIONSHIPS WITH OTHERS): 0 - NEVER BOTHERED
RATE HOW EMOTIONALLY BOTHERED YOU ARE BY YOUR SKIN PROBLEM (FOR EXAMPLE, WORRY, EMBARRASSMENT, FRUSTRATION): 0 - NEVER BOTHERED
RATE HOW BOTHERED YOU ARE BY SYMPTOMS OF YOUR SKIN PROBLEM (EG, ITCHING, STINGING BURNING, HURTING OR SKIN IRRITATION): 0 - NEVER BOTHERED
RATE HOW BOTHERED YOU ARE BY SYMPTOMS OF YOUR SKIN PROBLEM (EG, ITCHING, STINGING BURNING, HURTING OR SKIN IRRITATION): 0 - NEVER BOTHERED

## 2025-01-06 ASSESSMENT — PATIENT GLOBAL ASSESSMENT (PGA): WHAT IS THE PGA: PATIENT GLOBAL ASSESSMENT:  1 - CLEAR

## 2025-01-08 ENCOUNTER — APPOINTMENT (OUTPATIENT)
Dept: DERMATOLOGY | Facility: CLINIC | Age: 70
End: 2025-01-08
Payer: MEDICARE

## 2025-01-08 DIAGNOSIS — L82.1 SEBORRHEIC KERATOSIS: ICD-10-CM

## 2025-01-08 DIAGNOSIS — L57.0 ACTINIC KERATOSIS: ICD-10-CM

## 2025-01-08 DIAGNOSIS — D48.5 NEOPLASM OF UNCERTAIN BEHAVIOR OF SKIN: Primary | ICD-10-CM

## 2025-01-08 DIAGNOSIS — L57.8 DIFFUSE PHOTODAMAGE OF SKIN: ICD-10-CM

## 2025-01-08 DIAGNOSIS — L81.4 LENTIGO: ICD-10-CM

## 2025-01-12 NOTE — PROGRESS NOTES
"Gucci Tariq \"Felipe\" is a 69 y.o. male who presents for the following: Discuss recent biopsy result and management options.  Biopsy of a suspicious lesion on his left posterior mid leg performed at his last visit in our office on 10/30/24 revealed a \"pigmented actinic keratosis with mild melanocyte hyperplasia,\" and it was recommended further biopsy of this lesion be performed due to what appears to be partial sampling with melanocyte hyperplasia.  Of note, biopsy of a second suspicious lesion on his right posterior mid leg also performed at that visit revealed a seborrheic keratosis.    Today, the patient states the biopsy site healed well.  Since his last visit, he reports he has noticed a couple new scaly bumps on the back of his left leg, which he would like to have evaluated today.  He also notes a pinkish-reddish bump on his nose, which has been present for a few months and does not heal.  Lastly, he states he would like to discuss the possibility of completing a course of topical chemotherapy with Efudex 5% cream on his face.  He denies any other new, changing, or concerning skin lesions since his last visit; no bleeding, itching, or burning lesions.      Review of Systems:  No other skin or systemic complaints other than what is documented elsewhere in the note.    The following portions of the chart were reviewed this encounter and updated as appropriate:       Skin Cancer History  No skin cancer on file.    Specialty Problems    None      Past Dermatologic / Past Relevant Medical History:    - compound dysplastic nevus with mild atypia on right medial upper back inferior diagnosed at initial visit on 12/19/19   - AKs   - no history of skin cancer    Family History:    Father - nonmelanoma skin cancers  No family history of melanoma    Social History:    The patient is retired from working in tax management and recently moved from [place] to [place] with his wife after they retired; " his wife is a patient in our office as well, and they will be spending the month of February 2025 in [place], [place]     Allergies:  Patient has no known allergies.    Current Medications / CAM's:    Current Outpatient Medications:     amLODIPine (Norvasc) 5 mg tablet, TAKE 1 TABLET BY MOUTH DAILY, Disp: 100 tablet, Rfl: 2    atorvastatin (Lipitor) 10 mg tablet, TAKE 1 TABLET BY MOUTH ONCE  DAILY, Disp: 100 tablet, Rfl: 2    Bacillus coagulans (PROBIOTIC, B. COAGULANS, ORAL), Take by mouth., Disp: , Rfl:     budesonide-formoteroL (Symbicort) 80-4.5 mcg/actuation inhaler, INHALE 2 PUFFS TWICE DAILY. RINSE MOUTH AFTER USE., Disp: 10.2 g, Rfl: 3    glucos sul 2KCl-msm-chond-C-Mn (Glucosamine Chondroitin) 550-30-1 mg capsule, Take by mouth., Disp: , Rfl:     levothyroxine (Synthroid, Levoxyl) 112 mcg tablet, TAKE 1 TABLET BY MOUTH DAILY, Disp: 100 tablet, Rfl: 2    losartan (Cozaar) 50 mg tablet, TAKE 1 TABLET BY MOUTH DAILY AS  DIRECTED, Disp: 100 tablet, Rfl: 2    melatonin 5 mg tablet, Take 1 tablet (5 mg) by mouth as needed at bedtime for sleep., Disp: , Rfl:     multivitamin tablet, Take by mouth., Disp: , Rfl:     pantoprazole (ProtoNix) 40 mg EC tablet, Take 1 tablet (40 mg) by mouth once daily. Do not crush, chew, or split., Disp: 90 tablet, Rfl: 3     Objective   Well appearing patient in no apparent distress; mood and affect are within normal limits.    A skin examination was performed including: Face, neck, bilateral ears and legs. All findings within normal limits unless otherwise noted below.    Assessment/Plan   1. Neoplasm of uncertain behavior of skin (4)  Left Posterior Mid-Leg  1.4 cm pink and brown patch with an asymmetric pigment network and irregular borders           Shave removal    Lesion length (cm):  1.4  Margin per side (cm):  0  Lesion diameter (cm):  1.4  Informed consent: discussed and consent obtained    Timeout: patient name, date of birth, surgical site, and procedure verified     Procedure prep:  Patient was prepped and draped  Anesthesia: the lesion was anesthetized in a standard fashion    Anesthetic:  1% lidocaine w/ epinephrine 1-100,000 local infiltration  Instrument used: flexible razor blade    Hemostasis achieved with: aluminum chloride    Outcome: patient tolerated procedure well    Post-procedure details: sterile dressing applied and wound care instructions given    Dressing type: bandage and petrolatum      Staff Communication: Dermatology Local Anesthesia: 1 % Lidocaine / Epinephrine - Amount:0.5ml    Specimen 1 - Dermatopathology- DERM LAB  Differential Diagnosis: scar v AMH v pigmented AK; re-Bx  Check Margins Yes/No?:    Comments:    Dermpath Lab: Routine Histopathology (formalin-fixed tissue)    Left Posterior Lateral Proximal Leg  7 mm erythematous, scaly papule     Shave removal    Lesion length (cm):  0.7  Margin per side (cm):  0  Lesion diameter (cm):  0.7  Informed consent: discussed and consent obtained    Timeout: patient name, date of birth, surgical site, and procedure verified    Procedure prep:  Patient was prepped and draped  Anesthesia: the lesion was anesthetized in a standard fashion    Anesthetic:  1% lidocaine w/ epinephrine 1-100,000 local infiltration  Instrument used: flexible razor blade    Hemostasis achieved with: aluminum chloride    Outcome: patient tolerated procedure well    Post-procedure details: sterile dressing applied and wound care instructions given    Dressing type: bandage and petrolatum      Staff Communication: Dermatology Local Anesthesia: 1 % Lidocaine / Epinephrine - Amount:0.5ml    Specimen 2 - Dermatopathology- DERM LAB  Differential Diagnosis: SK v HAK v SCCIS  Check Margins Yes/No?:    Comments:    Dermpath Lab: Routine Histopathology (formalin-fixed tissue)    Left Posterior Distal Leg  6 mm erythematous, scaly papule     Shave removal    Lesion length (cm):  0.6  Margin per side (cm):  0  Lesion diameter (cm):  0.6  Informed  consent: discussed and consent obtained    Timeout: patient name, date of birth, surgical site, and procedure verified    Procedure prep:  Patient was prepped and draped  Anesthesia: the lesion was anesthetized in a standard fashion    Anesthetic:  1% lidocaine w/ epinephrine 1-100,000 local infiltration  Instrument used: flexible razor blade    Hemostasis achieved with: aluminum chloride    Outcome: patient tolerated procedure well    Post-procedure details: sterile dressing applied and wound care instructions given    Dressing type: bandage and petrolatum      Staff Communication: Dermatology Local Anesthesia: 1 % Lidocaine / Epinephrine - Amount:0.5ml    Specimen 3 - Dermatopathology- DERM LAB  Differential Diagnosis: SK v HAK v SCCIS  Check Margins Yes/No?:    Comments:    Dermpath Lab: Routine Histopathology (formalin-fixed tissue)    Mid-Nasal Supratip  3 mm pink, shiny papule           Lesion biopsy  Type of biopsy: tangential    Informed consent: discussed and consent obtained    Timeout: patient name, date of birth, surgical site, and procedure verified    Procedure prep:  Patient was prepped and draped  Anesthesia: the lesion was anesthetized in a standard fashion    Anesthetic:  1% lidocaine w/ epinephrine 1-100,000 local infiltration  Instrument used: flexible razor blade    Hemostasis achieved with: aluminum chloride    Outcome: patient tolerated procedure well    Post-procedure details: wound care instructions given      Staff Communication: Dermatology Local Anesthesia: 1 % Lidocaine / Epinephrine - Amount:0.5ml    Specimen 4 - Dermatopathology- DERM LAB  Differential Diagnosis: fibrous papule v BCC  Check Margins Yes/No?:    Comments:    Dermpath Lab: Routine Histopathology (formalin-fixed tissue)    Biopsy-proven pigmented actinic keratosis with mild melanocyte hyperplasia - left posterior mid leg.  The pre-cancerous nature of pigmented AK as well as the findings of mild melanocyte hyperplasia, its  presence on the margins and the recommendation the patient undergo further biopsy of this lesion, and management options were discussed extensively with the patient in the office today.  At this time, I recommend further biopsy of this lesion via shave technique for further evaluation, given the finding of mild melanocyte hyperplasia in the initial biopsy.  The patient expressed understanding, is in agreement with this plan, and wishes to proceed with the procedure today.    2. Actinic keratosis  Head - Anterior (Face)  Scattered on the patient's face, there are multiple erythematous, gritty, scaly macules     Actinic Keratoses - scattered on face.  The pre-cancerous nature of these lesions and treatment options, including liquid nitrogen cryotherapy and field therapy, such as with a course of topical therapy with Efudex 5% cream as well as photodynamic therapy, were discussed extensively with the patient today.  After discussing the risks and benefits of each of these options at length, the patient expressed understanding and wishes to begin a course of topical field therapy with Efudex 5% cream, which the patient was instructed to apply twice daily to affected areas of his face for 2-3 weeks.  The risks, benefits, and side effects of this medication, including the redness and irritation expected with its use, were discussed; the patient was instructed to discontinue use of the medication earlier if necessary due to excessive irritation.  I also prescribed topical steroid therapy with Triamcinolone 0.025% cream, which the patient may apply twice daily to affected areas for up to 7-10 days as needed for inflammation following the course of Efudex, which the patient plans to complete in March 2025.  The risks, benefits, and side effects of these medications, including the redness, irritation, and discomfort associated with Efudex use as well as possible skin atrophy with overuse of topical steroids, were discussed at  length.  I will have the patient return to my office in 3 to 4 months, pending the above biopsy results, for follow-up.  The patient expressed understanding, is in agreement with this plan, and wishes to proceed with cryotherapy today.    3. Seborrheic keratosis  Scattered on the patient's face, neck, trunk, and extremities, there are multiple tan- to light brown-colored, hyperkeratotic, stuck-on appearing papules of varying size and shape    Seborrheic Keratoses - the benign nature of these lesions was discussed with the patient today and reassurance provided.  No treatment is medically indicated for these lesions at this time.    4. Lentigo  Photodistributed  Multiple tan- to light brown-colored, round- to oval-shaped, symmetric and uniform-appearing macules and small patches consistent with lentigines scattered in sun-exposed areas.    Solar Lentigines and photodamage.  The clinically benign-appearing nature of these lesions and their relation to chronic sun exposure were discussed with the patient today and reassurance provided.  None of these lesions meet threshold for biopsy today, and thus no treatment is medically indicated for these lesions at this time.  The signs and symptoms of skin cancer were reviewed and the patient was advised to practice sun protection and sun avoidance, use daily sunscreen, and perform regular self skin exams.  The patient was instructed to monitor these lesions for any changes, such as in size, shape, or color, or associated symptoms and to call our office to schedule a return visit for re-evaluation if any such changes or symptoms are noticed in the future.  The patient expressed understanding and is in agreement with this plan.    5. Diffuse photodamage of skin  Photodistributed  Diffuse photodamage with actinic changes with telangiectasia and mottled pigmentation in sun-exposed areas.    History of dysplastic nevus and actinic keratoses and photodamage.  The patient was  recently seen in our office for routine follow-up and skin exam on 10/30/24, at which time no evidence of recurrence was noted.  I emphasized the importance of continuing to perform monthly self-skin exams using the ABCDs of monitoring moles, which were reviewed with the patient, as well as the importance of sun avoidance and sun protection with daily sunscreen use.  I will have the patient return to our office in 3-4 months, pending the above biopsy results, for routine follow-up and skin exam, and the patient was instructed to call our office should the patient notice any new, changing, symptomatic, or otherwise concerning skin lesions before then.  The patient expressed understanding and is in agreement with this plan.

## 2025-01-26 ENCOUNTER — PATIENT MESSAGE (OUTPATIENT)
Dept: PRIMARY CARE | Facility: CLINIC | Age: 70
End: 2025-01-26
Payer: MEDICARE

## 2025-01-26 DIAGNOSIS — E78.5 HYPERLIPIDEMIA, UNSPECIFIED HYPERLIPIDEMIA TYPE: ICD-10-CM

## 2025-01-27 ENCOUNTER — TELEPHONE (OUTPATIENT)
Dept: DERMATOLOGY | Facility: CLINIC | Age: 70
End: 2025-01-27
Payer: MEDICARE

## 2025-01-27 RX ORDER — ATORVASTATIN CALCIUM 10 MG/1
10 TABLET, FILM COATED ORAL DAILY
Qty: 100 TABLET | Refills: 2 | Status: SHIPPED | OUTPATIENT
Start: 2025-01-27

## 2025-01-27 NOTE — TELEPHONE ENCOUNTER
Pt  sent message in My Chart that he has been waiting for a callback to schedule appt with Dr Nichols for ED*C and treatment for AK with LN2 , at [place] office ....Messaged pt back that he should be getting callback ..Thanks

## 2025-01-28 ENCOUNTER — LAB (OUTPATIENT)
Dept: LAB | Facility: HOSPITAL | Age: 70
End: 2025-01-28
Payer: MEDICARE

## 2025-01-28 DIAGNOSIS — I10 BENIGN HYPERTENSION: ICD-10-CM

## 2025-01-28 RX ORDER — AMLODIPINE BESYLATE 5 MG/1
5 TABLET ORAL DAILY
Qty: 100 TABLET | Refills: 2 | Status: SHIPPED | OUTPATIENT
Start: 2025-01-28

## 2025-01-29 LAB
ALT SERPL-CCNC: 13 U/L (ref 9–46)
ANION GAP SERPL CALCULATED.4IONS-SCNC: 10 MMOL/L (CALC) (ref 7–17)
BUN SERPL-MCNC: 15 MG/DL (ref 7–25)
BUN/CREAT SERPL: NORMAL (CALC) (ref 6–22)
CALCIUM SERPL-MCNC: 9 MG/DL (ref 8.6–10.3)
CHLORIDE SERPL-SCNC: 105 MMOL/L (ref 98–110)
CHOLEST SERPL-MCNC: 142 MG/DL
CHOLEST/HDLC SERPL: 3 (CALC)
CO2 SERPL-SCNC: 25 MMOL/L (ref 20–32)
CREAT SERPL-MCNC: 1.05 MG/DL (ref 0.7–1.28)
EGFRCR SERPLBLD CKD-EPI 2021: 76 ML/MIN/1.73M2
ERYTHROCYTE [DISTWIDTH] IN BLOOD BY AUTOMATED COUNT: 13.2 % (ref 11–15)
EST. AVERAGE GLUCOSE BLD GHB EST-MCNC: 114 MG/DL
EST. AVERAGE GLUCOSE BLD GHB EST-SCNC: 6.3 MMOL/L
FERRITIN SERPL-MCNC: 19 NG/ML (ref 24–380)
GLUCOSE SERPL-MCNC: 96 MG/DL (ref 65–139)
HBA1C MFR BLD: 5.6 % OF TOTAL HGB
HCT VFR BLD AUTO: 47.9 % (ref 38.5–50)
HDLC SERPL-MCNC: 48 MG/DL
HGB BLD-MCNC: 15.3 G/DL (ref 13.2–17.1)
IRON SATN MFR SERPL: 29 % (CALC) (ref 20–48)
IRON SERPL-MCNC: 100 MCG/DL (ref 50–180)
LDLC SERPL CALC-MCNC: 73 MG/DL (CALC)
MCH RBC QN AUTO: 28.8 PG (ref 27–33)
MCHC RBC AUTO-ENTMCNC: 31.9 G/DL (ref 32–36)
MCV RBC AUTO: 90.2 FL (ref 80–100)
NONHDLC SERPL-MCNC: 94 MG/DL (CALC)
PLATELET # BLD AUTO: 197 THOUSAND/UL (ref 140–400)
PMV BLD REES-ECKER: 11.2 FL (ref 7.5–12.5)
POTASSIUM SERPL-SCNC: 4.6 MMOL/L (ref 3.5–5.3)
PSA SERPL-MCNC: 0.81 NG/ML
RBC # BLD AUTO: 5.31 MILLION/UL (ref 4.2–5.8)
SODIUM SERPL-SCNC: 140 MMOL/L (ref 135–146)
TIBC SERPL-MCNC: 346 MCG/DL (CALC) (ref 250–425)
TRIGL SERPL-MCNC: 128 MG/DL
WBC # BLD AUTO: 5.1 THOUSAND/UL (ref 3.8–10.8)

## 2025-01-30 DIAGNOSIS — M79.10 MYALGIA: Primary | ICD-10-CM

## 2025-01-30 DIAGNOSIS — D64.9 ANEMIA, UNSPECIFIED TYPE: ICD-10-CM

## 2025-01-30 RX ORDER — CYCLOBENZAPRINE HCL 5 MG
5 TABLET ORAL NIGHTLY PRN
Qty: 30 TABLET | Refills: 0 | Status: SHIPPED | OUTPATIENT
Start: 2025-01-30 | End: 2025-03-31

## 2025-02-07 DIAGNOSIS — E78.5 HYPERLIPIDEMIA, UNSPECIFIED HYPERLIPIDEMIA TYPE: ICD-10-CM

## 2025-02-07 DIAGNOSIS — J45.909 UNSPECIFIED ASTHMA, UNCOMPLICATED (HHS-HCC): ICD-10-CM

## 2025-02-07 RX ORDER — ATORVASTATIN CALCIUM 10 MG/1
10 TABLET, FILM COATED ORAL DAILY
Qty: 100 TABLET | Refills: 2 | Status: SHIPPED | OUTPATIENT
Start: 2025-02-07

## 2025-02-07 RX ORDER — DILTIAZEM HYDROCHLORIDE 60 MG/1
2 TABLET, FILM COATED ORAL 2 TIMES DAILY
Qty: 10 G | Refills: 2 | Status: SHIPPED | OUTPATIENT
Start: 2025-02-07

## 2025-03-19 ENCOUNTER — APPOINTMENT (OUTPATIENT)
Dept: DERMATOLOGY | Facility: CLINIC | Age: 70
End: 2025-03-19
Payer: MEDICARE

## 2025-03-19 DIAGNOSIS — L57.8 DIFFUSE PHOTODAMAGE OF SKIN: ICD-10-CM

## 2025-03-19 DIAGNOSIS — L57.0 ACTINIC KERATOSIS: Primary | ICD-10-CM

## 2025-03-19 DIAGNOSIS — D09.9 SQUAMOUS CELL CARCINOMA IN SITU: ICD-10-CM

## 2025-03-19 PROCEDURE — 17004 DESTROY PREMAL LESIONS 15/>: CPT | Performed by: DERMATOLOGY

## 2025-03-19 PROCEDURE — 17263 DSTRJ MAL LES T/A/L 2.1-3.0: CPT | Performed by: DERMATOLOGY

## 2025-03-19 NOTE — PROGRESS NOTES
"Gucci LOCKHART Nitesh Varma \"Felipe\" is a 69 y.o. male who presents for the following: Discuss recent biopsy results and treatment options.  Repeat biopsy of a lesion on his left posterior mid leg performed at his last visit in our office on 1/8/25 revealed an actinic keratosis and lentigo, and biopsy of 3 additional suspicious lesions also performed at that visit revealed a squamous cell carcinoma in situ on his left posterior lateral proximal leg, an AK on his left posterior distal leg, and dermal fibrosis on his mid nasal supratip.    Today, the patient states the biopsy sites healed well.  He denies any new, changing, or concerning skin lesions since his last visit; no bleeding, itching, or burning lesions.      Review of Systems:  No other skin or systemic complaints other than what is documented elsewhere in the note.    The following portions of the chart were reviewed this encounter and updated as appropriate:       Skin Cancer History  Biopsy Date Type Location Status   1/8/25 SCC in Situ Left Posterior Lateral Proximal Leg Treatment Complete  3/19/25     Specialty Problems    None      Past Dermatologic / Past Relevant Medical History:    - recent biopsy-proven SCC in situ on left posterior lateral proximal leg diagnosed at last visit on 1/8/25 and pending definitive treatment  - AKs  - compound dysplastic nevus with mild atypia on right medial upper back inferior diagnosed at initial visit on 12/19/19   - no history of melanoma    Family History:    Father - nonmelanoma skin cancers  No family history of melanoma    Social History:    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    Current Medications / CAM's:    Current Outpatient Medications:     " amLODIPine (Norvasc) 5 mg tablet, Take 1 tablet (5 mg) by mouth once daily., Disp: 100 tablet, Rfl: 2    atorvastatin (Lipitor) 10 mg tablet, Take 1 tablet (10 mg) by mouth once daily., Disp: 100 tablet, Rfl: 2    Bacillus coagulans (PROBIOTIC, B. COAGULANS, ORAL), Take by mouth., Disp: , Rfl:     budesonide-formoteroL (Symbicort) 80-4.5 mcg/actuation inhaler, INHALE 2 PUFFS TWICE DAILY. RINSE MOUTH AFTER USE., Disp: 10 g, Rfl: 2    cyclobenzaprine (Flexeril) 5 mg tablet, Take 1 tablet (5 mg) by mouth as needed at bedtime for muscle spasms., Disp: 30 tablet, Rfl: 0    glucos sul 2KCl-msm-chond-C-Mn (Glucosamine Chondroitin) 550-30-1 mg capsule, Take by mouth., Disp: , Rfl:     levothyroxine (Synthroid, Levoxyl) 112 mcg tablet, TAKE 1 TABLET BY MOUTH DAILY, Disp: 100 tablet, Rfl: 2    losartan (Cozaar) 50 mg tablet, TAKE 1 TABLET BY MOUTH DAILY AS  DIRECTED, Disp: 100 tablet, Rfl: 2    melatonin 5 mg tablet, Take 1 tablet (5 mg) by mouth as needed at bedtime for sleep., Disp: , Rfl:     multivitamin tablet, Take by mouth., Disp: , Rfl:     pantoprazole (ProtoNix) 40 mg EC tablet, Take 1 tablet (40 mg) by mouth once daily. Do not crush, chew, or split., Disp: 90 tablet, Rfl: 3     Objective   Well appearing patient in no apparent distress; mood and affect are within normal limits.    A skin examination was performed including: Face, neck, and bilateral legs. All findings within normal limits unless otherwise noted below.    Assessment/Plan   1. Actinic keratosis (17)  Left Lower Leg - Posterior (17)  On the patient's left posterior mid leg and left posterior distal leg, there are pink, well-healed scars at his recent biopsy sites each with a surrounding rim of erythema, grittiness, and scale; scattered on the patient's bilateral posterior legs, there are multiple erythematous, gritty, scaly macules    Biopsy-proven Actinic Keratoses and additional AKs - left posterior mid leg and left posterior distal leg and  scattered on bilateral posterior legs, respectively.  The pre-cancerous nature of these lesions and treatment options were discussed with the patient today.  At this time, I recommend treatment with liquid nitrogen cryotherapy.  The patient expressed understanding, is in agreement with this plan, and wishes to proceed with cryotherapy today.    Destr of lesion - Left Lower Leg - Posterior (17)  Complexity: simple    Destruction method: cryotherapy    Informed consent: discussed and consent obtained    Lesion destroyed using liquid nitrogen: Yes    Cryotherapy cycles:  1  Outcome: patient tolerated procedure well with no complications    Post-procedure details: wound care instructions given      2. Squamous cell carcinoma in situ  Left posterior lateral proximal leg  Pink, well-healed scar at his recent biopsy site    Destr of lesion    Destruction method: electrodesiccation and curettage    Informed consent: discussed and consent obtained    Timeout:  patient name, date of birth, surgical site, and procedure verified  Procedure prep:  Patient was prepped and draped  Anesthesia: the lesion was anesthetized in a standard fashion    Anesthetic:  1% lidocaine w/ epinephrine 1-100,000 local infiltration  Curettage performed in three different directions: Yes    Electrodesiccation performed over the curetted area: Yes    Curettage cycles:  3  Lesion length (cm):  1.2  Lesion width (cm):  1.2  Margin per side (cm):  0.6  Final wound size (cm):  2.4  Hemostasis achieved with:  electrodesiccation  Outcome: patient tolerated procedure well with no complications    Post-procedure details: sterile dressing applied and wound care instructions given    Dressing type: bandage and petrolatum      Biopsy-proven squamous cell carcinoma in situ - left posterior lateral proximal leg; diagnosed at last visit on 1/8/25 and pending definitive treatment.  The malignant nature of SCC in situ, the need for further definitive treatment, and  treatment options, including Mohs surgery, wide local excision, and Electrodesiccation & Curettage, were discussed extensively with the patient today.  After discussing the risks and benefits of each option, including the differences in cure rates and permanent scar results, the patient expressed understanding and wishes to proceed with definitive treatment with ED&C today.  I will have the patient return to our office in 3 months for re-evaluation of the ED&C site as well as routine follow-up and skin exam, and the patient was instructed to call should they notice any new, changing, symptomatic, or concerning skin lesions before then.  The patient expressed understanding, is in agreement with this plan, and wishes to proceed with the ED&C today.    3. Diffuse photodamage of skin  Photodistributed  Diffuse photodamage with actinic changes with telangiectasia and mottled pigmentation in sun-exposed areas.    History of squamous cell carcinoma in situ, dysplastic nevus, and actinic keratoses and photodamage.  The patient was recently seen in our office for routine follow-up and skin exam on 10/30/24, at which time no evidence of recurrence was noted.  I emphasized the importance of continuing to perform monthly self-skin exams using the ABCDs of monitoring moles, which were reviewed with the patient, as well as the importance of sun avoidance and sun protection with daily sunscreen use.  I will have the patient return to our office in 4 to 6 months from the date of his visit on 10/30/24 for routine follow-up and skin exam, and the patient was instructed to call our office should the patient notice any new, changing, symptomatic, or otherwise concerning skin lesions before then.  The patient expressed understanding and is in agreement with this plan.

## 2025-03-26 ENCOUNTER — TELEPHONE (OUTPATIENT)
Dept: PRIMARY CARE | Facility: CLINIC | Age: 70
End: 2025-03-26
Payer: MEDICARE

## 2025-03-26 DIAGNOSIS — K21.00 GASTROESOPHAGEAL REFLUX DISEASE WITH ESOPHAGITIS, UNSPECIFIED WHETHER HEMORRHAGE: ICD-10-CM

## 2025-03-26 DIAGNOSIS — E03.9 HYPOTHYROIDISM, UNSPECIFIED TYPE: ICD-10-CM

## 2025-03-26 DIAGNOSIS — I10 PRIMARY HYPERTENSION: ICD-10-CM

## 2025-03-26 RX ORDER — LEVOTHYROXINE SODIUM 112 UG/1
112 TABLET ORAL DAILY
Qty: 100 TABLET | Refills: 2 | Status: SHIPPED | OUTPATIENT
Start: 2025-03-26

## 2025-03-26 RX ORDER — LOSARTAN POTASSIUM 50 MG/1
50 TABLET ORAL DAILY
Qty: 100 TABLET | Refills: 2 | Status: SHIPPED | OUTPATIENT
Start: 2025-03-26

## 2025-03-26 RX ORDER — PANTOPRAZOLE SODIUM 40 MG/1
40 TABLET, DELAYED RELEASE ORAL DAILY
Qty: 90 TABLET | Refills: 3 | Status: SHIPPED | OUTPATIENT
Start: 2025-03-26 | End: 2026-03-26

## 2025-03-26 NOTE — TELEPHONE ENCOUNTER
Rx Refill Request Telephone Encounter  levothyroxine (Synthroid, Levoxyl) 112 mcg tablet  losartan (Cozaar) 50 mg tablet  pantoprazole (ProtoNix) 40 mg EC tablet     Pharmacy:   [facility]

## 2025-04-30 ENCOUNTER — APPOINTMENT (OUTPATIENT)
Dept: DERMATOLOGY | Facility: CLINIC | Age: 70
End: 2025-04-30
Payer: MEDICARE

## 2025-04-30 LAB
ALT SERPL-CCNC: 13 U/L (ref 9–46)
ANION GAP SERPL CALCULATED.4IONS-SCNC: 9 MMOL/L (CALC) (ref 7–17)
BUN SERPL-MCNC: 16 MG/DL (ref 7–25)
BUN/CREAT SERPL: NORMAL (CALC) (ref 6–22)
CALCIUM SERPL-MCNC: 8.9 MG/DL (ref 8.6–10.3)
CHLORIDE SERPL-SCNC: 107 MMOL/L (ref 98–110)
CHOLEST SERPL-MCNC: 150 MG/DL
CHOLEST/HDLC SERPL: 2.6 (CALC)
CO2 SERPL-SCNC: 24 MMOL/L (ref 20–32)
CREAT SERPL-MCNC: 0.94 MG/DL (ref 0.7–1.28)
EGFRCR SERPLBLD CKD-EPI 2021: 87 ML/MIN/1.73M2
ERYTHROCYTE [DISTWIDTH] IN BLOOD BY AUTOMATED COUNT: 13.5 % (ref 11–15)
EST. AVERAGE GLUCOSE BLD GHB EST-MCNC: 114 MG/DL
EST. AVERAGE GLUCOSE BLD GHB EST-SCNC: 6.3 MMOL/L
FERRITIN SERPL-MCNC: 21 NG/ML (ref 24–380)
GLUCOSE SERPL-MCNC: 98 MG/DL (ref 65–139)
HBA1C MFR BLD: 5.6 %
HCT VFR BLD AUTO: 44.6 % (ref 38.5–50)
HDLC SERPL-MCNC: 58 MG/DL
HGB BLD-MCNC: 14.9 G/DL (ref 13.2–17.1)
IRON SATN MFR SERPL: 28 % (CALC) (ref 20–48)
IRON SERPL-MCNC: 93 MCG/DL (ref 50–180)
LDLC SERPL CALC-MCNC: 76 MG/DL (CALC)
MCH RBC QN AUTO: 29.3 PG (ref 27–33)
MCHC RBC AUTO-ENTMCNC: 33.4 G/DL (ref 32–36)
MCV RBC AUTO: 87.6 FL (ref 80–100)
NONHDLC SERPL-MCNC: 92 MG/DL (CALC)
PLATELET # BLD AUTO: 180 THOUSAND/UL (ref 140–400)
PMV BLD REES-ECKER: 10.8 FL (ref 7.5–12.5)
POTASSIUM SERPL-SCNC: 4.2 MMOL/L (ref 3.5–5.3)
PSA SERPL-MCNC: 0.69 NG/ML
RBC # BLD AUTO: 5.09 MILLION/UL (ref 4.2–5.8)
SODIUM SERPL-SCNC: 140 MMOL/L (ref 135–146)
TIBC SERPL-MCNC: 335 MCG/DL (CALC) (ref 250–425)
TRIGL SERPL-MCNC: 78 MG/DL
WBC # BLD AUTO: 5.1 THOUSAND/UL (ref 3.8–10.8)

## 2025-05-21 DIAGNOSIS — J40 BRONCHITIS: Primary | ICD-10-CM

## 2025-05-21 RX ORDER — PREDNISONE 20 MG/1
40 TABLET ORAL DAILY
Qty: 10 TABLET | Refills: 0 | Status: SHIPPED | OUTPATIENT
Start: 2025-05-21 | End: 2025-05-26

## 2025-06-03 ASSESSMENT — DERMATOLOGY QUALITY OF LIFE (QOL) ASSESSMENT
RATE HOW EMOTIONALLY BOTHERED YOU ARE BY YOUR SKIN PROBLEM (FOR EXAMPLE, WORRY, EMBARRASSMENT, FRUSTRATION): 0 - NEVER BOTHERED
WHAT SINGLE SKIN CONDITION LISTED BELOW IS THE PATIENT ANSWERING THE QUALITY-OF-LIFE ASSESSMENT QUESTIONS ABOUT: NONE OF THE ABOVE
RATE HOW BOTHERED YOU ARE BY SYMPTOMS OF YOUR SKIN PROBLEM (EG, ITCHING, STINGING BURNING, HURTING OR SKIN IRRITATION): 0 - NEVER BOTHERED
RATE HOW BOTHERED YOU ARE BY EFFECTS OF YOUR SKIN PROBLEMS ON YOUR ACTIVITIES (EG, GOING OUT, ACCOMPLISHING WHAT YOU WANT, WORK ACTIVITIES OR YOUR RELATIONSHIPS WITH OTHERS): 0 - NEVER BOTHERED
DATE THE QUALITY-OF-LIFE ASSESSMENT WAS COMPLETED: 67359
WHAT SINGLE SKIN CONDITION LISTED BELOW IS THE PATIENT ANSWERING THE QUALITY-OF-LIFE ASSESSMENT QUESTIONS ABOUT: NONE OF THE ABOVE
RATE HOW BOTHERED YOU ARE BY EFFECTS OF YOUR SKIN PROBLEMS ON YOUR ACTIVITIES (EG, GOING OUT, ACCOMPLISHING WHAT YOU WANT, WORK ACTIVITIES OR YOUR RELATIONSHIPS WITH OTHERS): 0 - NEVER BOTHERED
RATE HOW EMOTIONALLY BOTHERED YOU ARE BY YOUR SKIN PROBLEM (FOR EXAMPLE, WORRY, EMBARRASSMENT, FRUSTRATION): 0 - NEVER BOTHERED
RATE HOW BOTHERED YOU ARE BY SYMPTOMS OF YOUR SKIN PROBLEM (EG, ITCHING, STINGING BURNING, HURTING OR SKIN IRRITATION): 0 - NEVER BOTHERED

## 2025-06-03 ASSESSMENT — PATIENT GLOBAL ASSESSMENT (PGA): WHAT IS THE PGA: PATIENT GLOBAL ASSESSMENT:  1 - CLEAR

## 2025-06-04 ENCOUNTER — APPOINTMENT (OUTPATIENT)
Dept: DERMATOLOGY | Facility: CLINIC | Age: 70
End: 2025-06-04
Payer: MEDICARE

## 2025-06-04 DIAGNOSIS — L82.1 SEBORRHEIC KERATOSIS: ICD-10-CM

## 2025-06-04 DIAGNOSIS — D48.5 NEOPLASM OF UNCERTAIN BEHAVIOR OF SKIN: Primary | ICD-10-CM

## 2025-06-04 DIAGNOSIS — Z85.828 HISTORY OF NONMELANOMA SKIN CANCER: ICD-10-CM

## 2025-06-04 DIAGNOSIS — L21.9 SEBORRHEIC DERMATITIS: ICD-10-CM

## 2025-06-04 DIAGNOSIS — L57.0 ACTINIC KERATOSIS: ICD-10-CM

## 2025-06-04 DIAGNOSIS — D22.5 MELANOCYTIC NEVUS OF TRUNK: ICD-10-CM

## 2025-06-04 DIAGNOSIS — L57.8 DIFFUSE PHOTODAMAGE OF SKIN: ICD-10-CM

## 2025-06-04 DIAGNOSIS — D18.01 HEMANGIOMA OF SKIN: ICD-10-CM

## 2025-06-04 PROCEDURE — 11104 PUNCH BX SKIN SINGLE LESION: CPT | Performed by: DERMATOLOGY

## 2025-06-04 PROCEDURE — 11302 SHAVE SKIN LESION 1.1-2.0 CM: CPT | Performed by: DERMATOLOGY

## 2025-06-04 PROCEDURE — 11311 SHAVE SKIN LESION 0.6-1.0 CM: CPT | Performed by: DERMATOLOGY

## 2025-06-04 PROCEDURE — 1159F MED LIST DOCD IN RCRD: CPT | Performed by: DERMATOLOGY

## 2025-06-04 PROCEDURE — 99214 OFFICE O/P EST MOD 30 MIN: CPT | Performed by: DERMATOLOGY

## 2025-06-04 PROCEDURE — 17004 DESTROY PREMAL LESIONS 15/>: CPT | Performed by: DERMATOLOGY

## 2025-06-04 ASSESSMENT — DERMATOLOGY PATIENT ASSESSMENT
ARE YOU AN ORGAN TRANSPLANT RECIPIENT: NO
DO YOU USE A TANNING BED: NO
DO YOU USE SUNSCREEN: OCCASIONALLY
HAVE YOU HAD OR DO YOU HAVE A STAPH INFECTION: NO
HAVE YOU HAD OR DO YOU HAVE VASCULAR DISEASE: NO
DO YOU HAVE ANY NEW OR CHANGING LESIONS: YES

## 2025-06-04 ASSESSMENT — DERMATOLOGY QUALITY OF LIFE (QOL) ASSESSMENT
RATE HOW BOTHERED YOU ARE BY SYMPTOMS OF YOUR SKIN PROBLEM (EG, ITCHING, STINGING BURNING, HURTING OR SKIN IRRITATION): 0 - NEVER BOTHERED
RATE HOW EMOTIONALLY BOTHERED YOU ARE BY YOUR SKIN PROBLEM (FOR EXAMPLE, WORRY, EMBARRASSMENT, FRUSTRATION): 0 - NEVER BOTHERED
RATE HOW BOTHERED YOU ARE BY EFFECTS OF YOUR SKIN PROBLEMS ON YOUR ACTIVITIES (EG, GOING OUT, ACCOMPLISHING WHAT YOU WANT, WORK ACTIVITIES OR YOUR RELATIONSHIPS WITH OTHERS): 0 - NEVER BOTHERED
DATE THE QUALITY-OF-LIFE ASSESSMENT WAS COMPLETED: 67360
ARE THERE EXCLUSIONS OR EXCEPTIONS FOR THE QUALITY OF LIFE ASSESSMENT: NO
WHAT SINGLE SKIN CONDITION LISTED BELOW IS THE PATIENT ANSWERING THE QUALITY-OF-LIFE ASSESSMENT QUESTIONS ABOUT: NONE OF THE ABOVE

## 2025-06-04 ASSESSMENT — PATIENT GLOBAL ASSESSMENT (PGA): PATIENT GLOBAL ASSESSMENT: PATIENT GLOBAL ASSESSMENT:  1 - CLEAR

## 2025-06-04 ASSESSMENT — ITCH NUMERIC RATING SCALE: HOW SEVERE IS YOUR ITCHING?: 0

## 2025-06-04 NOTE — Clinical Note
Palma Angiomas - the benign nature of these vascular lesions was discussed with the patient today and reassurance provided.  No treatment is medically indicated for these lesions at this time.

## 2025-06-04 NOTE — Clinical Note
History of squamous cell carcinoma in situ, dysplastic nevus, and actinic keratoses and photodamage.  There is no evidence of recurrence on exam today.  The signs and symptoms of skin cancer were reviewed and the patient was advised to practice sun protection and sun avoidance, use daily sunscreen, and perform regular self skin exams.  I will have the patient return to our office in 4 to 6 months, pending the above biopsy results, for routine follow-up and skin exam, and the patient was instructed to call our office should the patient notice any new, changing, symptomatic, or otherwise concerning skin lesions before then.  The patient expressed understanding and is in agreement with this plan.

## 2025-06-04 NOTE — Clinical Note
On the patient's left posterior lateral proximal leg and right medial upper back inferior, there are well-healed scars with no evidence of recurrent growth on exam today.

## 2025-06-05 NOTE — PROGRESS NOTES
"Gucci Tariq \"Felipe\" is a 69 y.o. male who presents for the following: Skin Check.  He notes a pink bump on his left arm, which has been present for several years, has increased in size, and sometimes hurts.  He also notes a brown spot on his left lower chest, which has increased in size and gotten darker.  He also notes dry, flaky skin on his face, especially on his lateral forehead and between his eyebrows.  Lastly, he states he did not use the Efudex cream prescribed at his last visit.  He denies any other new, changing, or concerning skin lesions since his last visit; no bleeding, itching, or burning lesions.      Review of Systems:  No other skin or systemic complaints other than what is documented elsewhere in the note.    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 diagnosed on 1/8/25 s/p ED&C on 3/19/25  - AKs  - compound dysplastic nevus with mild atypia on right medial upper back inferior diagnosed at initial visit on 12/19/19   - seborrheic dermatitis  - no history of melanoma    Family History:    Father - nonmelanoma skin cancers  No family history of melanoma    Social History:    The patient is retired from working in tax management and recently moved from [place] to [place] with his wife after they retired; his wife is a patient in our office as well, and they spent the month of February 2025 in [place], [place]     Allergies:  Patient has no known allergies.    Current Medications / CAM's:  Current Medications[1]     Objective   Well appearing patient in no apparent distress; mood and affect " are within normal limits.    A full examination was performed including scalp, face, eyes, ears, nose, lips, neck, chest, axillae, abdomen, back, bilateral upper extremities, and bilateral lower extremities. All findings within normal limits unless otherwise noted below.    Assessment/Plan   Skin Exam  1. NEOPLASM OF UNCERTAIN BEHAVIOR OF SKIN (3)  Left Anterior Mid-Arm  6 mm pink, shiny papule     Lesion biopsy  Type of biopsy: punch    Informed consent: discussed and consent obtained    Timeout: patient name, date of birth, surgical site, and procedure verified    Procedure prep:  Patient was prepped and draped  Anesthesia: the lesion was anesthetized in a standard fashion    Anesthetic:  1% lidocaine w/ epinephrine 1-100,000 local infiltration  Punch size:  6 mm  Suture size:  5-0  Suture type: Prolene (polypropylene)    Suture removal (days):  14  Hemostasis achieved with: suture    Outcome: patient tolerated procedure well    Post-procedure details: sterile dressing applied and wound care instructions given    Dressing type: bandage and petrolatum      Staff Communication: Dermatology Local Anesthesia: 1 % Lidocaine / Epinephrine - Amount:0.5ml  Specimen 1 - Dermatopathology- DERM LAB  Differential Diagnosis: nevus lipomatosis superficialis v NF v BCC  Check Margins Yes/No?:    Comments:    Dermpath Lab: Routine Histopathology (formalin-fixed tissue)  Left Lower Chest  1.2 cm dark brown pigmented, asymmetric patch with an asymmetric pigment network and irregular borders     Shave removal    Lesion length (cm):  1.2  Margin per side (cm):  0  Lesion diameter (cm):  1.2  Informed consent: discussed and consent obtained    Timeout: patient name, date of birth, surgical site, and procedure verified    Procedure prep:  Patient was prepped and draped  Anesthesia: the lesion was anesthetized in a standard fashion    Anesthetic:  1% lidocaine w/ epinephrine 1-100,000 local infiltration  Instrument used: flexible razor  blade    Hemostasis achieved with: aluminum chloride    Outcome: patient tolerated procedure well    Post-procedure details: sterile dressing applied and wound care instructions given    Dressing type: bandage and petrolatum      Staff Communication: Dermatology Local Anesthesia: 1 % Lidocaine / Epinephrine - Amount:0.5ml  Specimen 2 - Dermatopathology- DERM LAB  Differential Diagnosis: lentigo v AMH  Check Margins Yes/No?:    Comments:    Dermpath Lab: Routine Histopathology (formalin-fixed tissue)  Left Temple  6 mm pink, shiny papule     Shave removal    Lesion length (cm):  0.6  Margin per side (cm):  0  Lesion diameter (cm):  0.6  Informed consent: discussed and consent obtained    Timeout: patient name, date of birth, surgical site, and procedure verified    Procedure prep:  Patient was prepped and draped  Anesthesia: the lesion was anesthetized in a standard fashion    Anesthetic:  1% lidocaine w/ epinephrine 1-100,000 local infiltration  Instrument used: flexible razor blade    Hemostasis achieved with: aluminum chloride    Outcome: patient tolerated procedure well    Post-procedure details: sterile dressing applied and wound care instructions given    Dressing type: bandage and petrolatum      Staff Communication: Dermatology Local Anesthesia: 1 % Lidocaine / Epinephrine - Amount:0.5ml  Specimen 3 - Dermatopathology- DERM LAB  Differential Diagnosis: BCC v SK  Check Margins Yes/No?:    Comments:    Dermpath Lab: Routine Histopathology (formalin-fixed tissue)  2. ACTINIC KERATOSIS (17)  Head - Anterior (Face) (17)  Scattered on the patient's face, there are multiple erythematous, gritty, scaly macules   Actinic Keratoses -scattered on face.  The pre-cancerous nature of these lesions and treatment options were discussed with the patient today.  At this time, I recommend treatment with liquid nitrogen cryotherapy.  The patient expressed understanding, is in agreement with this plan, and wishes to proceed with  cryotherapy today.  Destr of lesion - Head - Anterior (Face) (17)  Complexity: simple    Destruction method: cryotherapy    Informed consent: discussed and consent obtained    Lesion destroyed using liquid nitrogen: Yes    Cryotherapy cycles:  1  Outcome: patient tolerated procedure well with no complications    Post-procedure details: wound care instructions given    3. MELANOCYTIC NEVUS OF TRUNK  Generalized  Scattered on the patient's face, neck, trunk, and extremities, there are several small, round- to oval-shaped, brown-pigmented and pink-colored, symmetric, uniform-appearing macules and dome-shaped papules  Clinically benign- to slightly atypical-appearing nevi - the clinically benign- to slightly atypical-appearing nature of the patient's nevi was discussed with the patient today.  None of the patient's nevi meet threshold for biopsy today.  I emphasized the importance of performing monthly self-skin exams using the ABCDs of monitoring moles, which were reviewed with the patient today and an informational hand-out provided.  I also emphasized the importance of sun avoidance and sun protection with daily sunscreen use.  The patient expressed understanding and is in agreement with this plan.  4. SEBORRHEIC KERATOSIS  Generalized  Scattered on the patient's face, neck, trunk, and extremities, there are multiple tan- to light brown-colored, hyperkeratotic, stuck-on appearing papules of varying size and shape  Seborrheic Keratoses - the benign nature of these lesions was discussed with the patient today and reassurance provided.  No treatment is medically indicated for these lesions at this time.  5. HEMANGIOMA OF SKIN  Generalized  Scattered on the patient's face, neck, trunk, and extremities, there are multiple small, round, cherry red- to purplish-colored, symmetric, uniform, vascular-appearing macules and papules  Cherry Angiomas - the benign nature of these vascular lesions was discussed with the patient  today and reassurance provided.  No treatment is medically indicated for these lesions at this time.  6. SEBORRHEIC DERMATITIS  Head - Anterior (Face)  On the patient's face, mainly the glabella and bilateral eyebrows and perinasal creases, there are pink, scaly patches with whitish-yellowish, greasy scale  Seborrheic Dermatitis - flare on face.  The potentially chronic and intermittently flaring nature of this condition and treatment options were discussed extensively with the patient today.  At this time, I recommend topical anti-fungal therapy with Ketoconazole 2% cream, which the patient was instructed to apply twice daily to the affected areas of the face.  The risks, benefits, and side effects of this medication were discussed.  The patient expressed understanding and is in agreement with this plan.  7. HISTORY OF NONMELANOMA SKIN CANCER  Generalized  On the patient's left posterior lateral proximal leg and right medial upper back inferior, there are well-healed scars with no evidence of recurrent growth on exam today.  History of squamous cell carcinoma in situ, dysplastic nevus, and actinic keratoses and photodamage.  There is no evidence of recurrence on exam today.  The signs and symptoms of skin cancer were reviewed and the patient was advised to practice sun protection and sun avoidance, use daily sunscreen, and perform regular self skin exams.  I will have the patient return to our office in 4 to 6 months, pending the above biopsy results, for routine follow-up and skin exam, and the patient was instructed to call our office should the patient notice any new, changing, symptomatic, or otherwise concerning skin lesions before then.  The patient expressed understanding and is in agreement with this plan.  8. DIFFUSE PHOTODAMAGE OF SKIN  Photodistributed  Diffuse photodamage with actinic changes with telangiectasia and mottled pigmentation in sun-exposed areas.  Photodamage.  The signs and symptoms of skin  cancer were reviewed and the patient was advised to practice sun protection and sun avoidance, use daily sunscreen, and perform regular self skin exams.  Sun protection was discussed, including avoiding the mid-day sun, wearing a sunscreen with SPF at least 50, and stressing the need for reapplication of sunscreen and applying more than they think they need.          [1]   Current Outpatient Medications:     amLODIPine (Norvasc) 5 mg tablet, Take 1 tablet (5 mg) by mouth once daily., Disp: 100 tablet, Rfl: 2    atorvastatin (Lipitor) 10 mg tablet, Take 1 tablet (10 mg) by mouth once daily., Disp: 100 tablet, Rfl: 2    Bacillus coagulans (PROBIOTIC, B. COAGULANS, ORAL), Take by mouth., Disp: , Rfl:     budesonide-formoteroL (Symbicort) 80-4.5 mcg/actuation inhaler, INHALE 2 PUFFS TWICE DAILY. RINSE MOUTH AFTER USE., Disp: 10 g, Rfl: 2    glucos sul 2KCl-msm-chond-C-Mn (Glucosamine Chondroitin) 550-30-1 mg capsule, Take by mouth., Disp: , Rfl:     levothyroxine (Synthroid, Levoxyl) 112 mcg tablet, Take 1 tablet (112 mcg) by mouth once daily. Take on an empty stomach at the same time each day, either 30 to 60 minutes prior to breakfast, Disp: 100 tablet, Rfl: 2    losartan (Cozaar) 50 mg tablet, Take 1 tablet (50 mg) by mouth once daily. as directed, Disp: 100 tablet, Rfl: 2    melatonin 5 mg tablet, Take 1 tablet (5 mg) by mouth as needed at bedtime for sleep., Disp: , Rfl:     multivitamin tablet, Take by mouth., Disp: , Rfl:     pantoprazole (ProtoNix) 40 mg EC tablet, Take 1 tablet (40 mg) by mouth once daily. Do not crush, chew, or split., Disp: 90 tablet, Rfl: 3

## 2025-06-16 ASSESSMENT — DERMATOLOGY QUALITY OF LIFE (QOL) ASSESSMENT
RATE HOW EMOTIONALLY BOTHERED YOU ARE BY YOUR SKIN PROBLEM (FOR EXAMPLE, WORRY, EMBARRASSMENT, FRUSTRATION): 0 - NEVER BOTHERED
RATE HOW BOTHERED YOU ARE BY EFFECTS OF YOUR SKIN PROBLEMS ON YOUR ACTIVITIES (EG, GOING OUT, ACCOMPLISHING WHAT YOU WANT, WORK ACTIVITIES OR YOUR RELATIONSHIPS WITH OTHERS): 0 - NEVER BOTHERED
RATE HOW EMOTIONALLY BOTHERED YOU ARE BY YOUR SKIN PROBLEM (FOR EXAMPLE, WORRY, EMBARRASSMENT, FRUSTRATION): 0 - NEVER BOTHERED
RATE HOW BOTHERED YOU ARE BY SYMPTOMS OF YOUR SKIN PROBLEM (EG, ITCHING, STINGING BURNING, HURTING OR SKIN IRRITATION): 0 - NEVER BOTHERED
WHAT SINGLE SKIN CONDITION LISTED BELOW IS THE PATIENT ANSWERING THE QUALITY-OF-LIFE ASSESSMENT QUESTIONS ABOUT: NONE OF THE ABOVE
RATE HOW BOTHERED YOU ARE BY SYMPTOMS OF YOUR SKIN PROBLEM (EG, ITCHING, STINGING BURNING, HURTING OR SKIN IRRITATION): 0 - NEVER BOTHERED
DATE THE QUALITY-OF-LIFE ASSESSMENT WAS COMPLETED: 67372
WHAT SINGLE SKIN CONDITION LISTED BELOW IS THE PATIENT ANSWERING THE QUALITY-OF-LIFE ASSESSMENT QUESTIONS ABOUT: NONE OF THE ABOVE
RATE HOW BOTHERED YOU ARE BY EFFECTS OF YOUR SKIN PROBLEMS ON YOUR ACTIVITIES (EG, GOING OUT, ACCOMPLISHING WHAT YOU WANT, WORK ACTIVITIES OR YOUR RELATIONSHIPS WITH OTHERS): 0 - NEVER BOTHERED

## 2025-06-16 ASSESSMENT — PATIENT GLOBAL ASSESSMENT (PGA): WHAT IS THE PGA: PATIENT GLOBAL ASSESSMENT:  1 - CLEAR

## 2025-06-18 ENCOUNTER — APPOINTMENT (OUTPATIENT)
Dept: DERMATOLOGY | Facility: CLINIC | Age: 70
End: 2025-06-18
Payer: MEDICARE

## 2025-06-18 DIAGNOSIS — Z48.02 ENCOUNTER FOR REMOVAL OF SUTURES: Primary | ICD-10-CM

## 2025-06-18 PROCEDURE — 99024 POSTOP FOLLOW-UP VISIT: CPT | Performed by: DERMATOLOGY

## 2025-06-18 NOTE — PROGRESS NOTES
Felipe Tariq is a 69 y.o. male  thouPt here for suture removal,sutures intact .Location of the sutures are left anterior mid-arm . Incision well approximated and well granulated without signs of infections. Incision cleaned with alcohol. Sutures removed without incident. Pt. Instructed to call clinic with increase redness,pain,swelling or drainage.

## 2025-12-10 ENCOUNTER — APPOINTMENT (OUTPATIENT)
Dept: DERMATOLOGY | Facility: CLINIC | Age: 70
End: 2025-12-10
Payer: MEDICARE